# Patient Record
Sex: FEMALE | Race: WHITE | Employment: OTHER | RURAL
[De-identification: names, ages, dates, MRNs, and addresses within clinical notes are randomized per-mention and may not be internally consistent; named-entity substitution may affect disease eponyms.]

---

## 2017-02-23 ENCOUNTER — TELEPHONE (OUTPATIENT)
Dept: FAMILY MEDICINE CLINIC | Age: 77
End: 2017-02-23

## 2017-02-23 ENCOUNTER — OFFICE VISIT (OUTPATIENT)
Dept: FAMILY MEDICINE CLINIC | Age: 77
End: 2017-02-23

## 2017-02-23 DIAGNOSIS — R82.90 URINE ABNORMALITY: Primary | ICD-10-CM

## 2017-02-23 LAB
BILIRUB UR QL STRIP: NEGATIVE
GLUCOSE UR-MCNC: NEGATIVE MG/DL
KETONES P FAST UR STRIP-MCNC: NEGATIVE MG/DL
PH UR STRIP: 7 [PH] (ref 4.6–8)
PROT UR QL STRIP: NEGATIVE MG/DL
SP GR UR STRIP: 1.01 (ref 1–1.03)
UA UROBILINOGEN AMB POC: NORMAL (ref 0.2–1)
URINALYSIS CLARITY POC: CLEAR
URINALYSIS COLOR POC: YELLOW
URINE BLOOD POC: NEGATIVE
URINE LEUKOCYTES POC: NORMAL
URINE NITRITES POC: NEGATIVE

## 2017-02-23 RX ORDER — NITROFURANTOIN 25; 75 MG/1; MG/1
100 CAPSULE ORAL 2 TIMES DAILY
Qty: 10 CAP | Refills: 1 | Status: SHIPPED | OUTPATIENT
Start: 2017-02-23 | End: 2017-03-17

## 2017-02-23 NOTE — TELEPHONE ENCOUNTER
Patient would like to bring in a U/A for Sx of a UTI. Try cell number if you can't reach her on the house line.

## 2017-02-23 NOTE — PROGRESS NOTES
SUBJECTIVE: Giuliana Lindsay is a 64 y.o. female who complains of urinary frequency, urgency and dysuria x 2 days, without flank pain, fever, chills, or abnormal discharge or bleeding. OBJECTIVE: Appears well, in no apparent distress. Vital signs are normal. The abdomen is soft without tenderness, guarding, mass, rebound or organomegaly. No CVA tenderness or inguinal adenopathy noted. Urine dipstick shows positive for leukocytes. ASSESSMENT: UTI uncomplicated without evidence of pyelonephritis    PLAN: Treatment per orders - also push fluids, may use Pyridium OTC prn. Call or return to clinic prn if these symptoms worsen or fail to improve as anticipated.       Torres Irving MD    \

## 2017-02-23 NOTE — MR AVS SNAPSHOT
Visit Information Date & Time Provider Department Dept. Phone Encounter #  
 2/23/2017 10:30 AM Hannah Franklin MD [address] [place] [address] [phone] [id number] Upcoming Health Maintenance Date Due DTaP/Tdap/Td series (1 - Tdap) 5/29/1959 OSTEOPOROSIS SCREENING (DEXA) 5/29/2003 Pneumococcal 65+ Low/Medium Risk (1 of 2 - PCV13) 5/29/2003 GLAUCOMA SCREENING Q2Y 1/1/2016 INFLUENZA AGE 9 TO ADULT 8/1/2016 MEDICARE YEARLY EXAM 4/14/2017 Allergies as of 2/23/2017  Review Complete On: 4/13/2016 By: ERNESTINE Franco No Known Allergies Current Immunizations  Reviewed on 4/13/2016 No immunizations on file. Not reviewed this visit You Were Diagnosed With   
  
 Codes Comments Urine abnormality    -  Primary ICD-10-CM: R82.90 ICD-9-CM: 791.9 Vitals OB Status Hysterectomy Preferred Pharmacy Pharmacy Name Phone [address] [phone] Your Updated Medication List  
  
   
This list is accurate as of: 2/23/17 11:23 AM.  Always use your most recent med list.  
  
  
  
  
 aspirin 81 mg chewable tablet Take 81 mg by mouth daily. CRAN- mg Cap capsule Generic drug:  cranberry extract Take 500 mg by mouth daily. ESTRACE 0.01 % (0.1 mg/gram) vaginal cream  
Generic drug:  estradiol  
insert 1/2 gram vaginally two times a week  
  
 omeprazole 20 mg capsule Commonly known as:  PRILOSEC  
TAKE 1 CAPSULE EVERY DAY We Performed the Following AMB POC URINALYSIS DIP STICK AUTO W/O MICRO [40167 CPT(R)] CULTURE, URINE [id number] CPT(R)] Introducing [facility]! Selena Davis introduces [employer] patient portal. Now you can access parts of your medical record, email your doctor's office, and request medication refills online.    
 
1. In your internet browser, go to [email or web address] 2. Click on the First Time User? Click Here link in the Sign In box. You will see the New Member Sign Up page. 3. Enter your [employer] Access Code exactly as it appears below. You will not need to use this code after youve completed the sign-up process. If you do not sign up before the expiration date, you must request a new code. · [employer] Access Code: [email or web address] Expires: 5/24/2017 11:14 AM 
 
4. Enter the last four digits of your Social Security Number (xxxx) and Date of Birth (mm/dd/yyyy) as indicated and click Submit. You will be taken to the next sign-up page. 5. Create a Nimiat ID. This will be your [employer]Tax login ID and cannot be changed, so think of one that is secure and easy to remember. 6. Create a [employer] password. You can change your password at any time. 7. Enter your Password Reset Question and Answer. This can be used at a later time if you forget your password. 8. Enter your e-mail address. You will receive e-mail notification when new information is available in [address] 9. Click Sign Up. You can now view and download portions of your medical record. 10. Click the Download Summary menu link to download a portable copy of your medical information. If you have questions, please visit the Frequently Asked Questions section of the [employer] website. Remember, [employer] is NOT to be used for urgent needs. For medical emergencies, dial 911. Now available from your iPhone and Android! Please provide this summary of care documentation to your next provider. Your primary care clinician is listed as Aditya Cadet. If you have any questions after today's visit, please call [phone].

## 2017-02-25 LAB — BACTERIA UR CULT: ABNORMAL

## 2017-03-17 ENCOUNTER — OFFICE VISIT (OUTPATIENT)
Dept: FAMILY MEDICINE CLINIC | Age: 77
End: 2017-03-17

## 2017-03-17 VITALS
RESPIRATION RATE: 20 BRPM | DIASTOLIC BLOOD PRESSURE: 70 MMHG | TEMPERATURE: 98.1 F | WEIGHT: 137 LBS | BODY MASS INDEX: 21.5 KG/M2 | SYSTOLIC BLOOD PRESSURE: 130 MMHG | OXYGEN SATURATION: 99 % | HEART RATE: 81 BPM | HEIGHT: 67 IN

## 2017-03-17 DIAGNOSIS — R39.9 UTI SYMPTOMS: Primary | ICD-10-CM

## 2017-03-17 LAB
BILIRUB UR QL STRIP: NEGATIVE
GLUCOSE UR-MCNC: NEGATIVE MG/DL
KETONES P FAST UR STRIP-MCNC: NEGATIVE MG/DL
PH UR STRIP: 7 [PH] (ref 4.6–8)
PROT UR QL STRIP: NEGATIVE MG/DL
SP GR UR STRIP: 1.02 (ref 1–1.03)
UA UROBILINOGEN AMB POC: NORMAL (ref 0.2–1)
URINALYSIS CLARITY POC: CLEAR
URINALYSIS COLOR POC: YELLOW
URINE BLOOD POC: NEGATIVE
URINE LEUKOCYTES POC: NORMAL
URINE NITRITES POC: NEGATIVE

## 2017-03-17 RX ORDER — SULFAMETHOXAZOLE AND TRIMETHOPRIM 800; 160 MG/1; MG/1
1 TABLET ORAL 2 TIMES DAILY
Qty: 10 TAB | Refills: 0 | Status: SHIPPED | OUTPATIENT
Start: 2017-03-17 | End: 2017-03-22

## 2017-03-17 NOTE — MR AVS SNAPSHOT
Visit Information Date & Time Provider Department Dept. Phone Encounter #  
 3/17/2017  3:00 PM Aditya Cadet, [address] [phone] [id number] Upcoming Health Maintenance Date Due DTaP/Tdap/Td series (1 - Tdap) 5/29/1959 OSTEOPOROSIS SCREENING (DEXA) 5/29/2003 Pneumococcal 65+ Low/Medium Risk (1 of 2 - PCV13) 5/29/2003 GLAUCOMA SCREENING Q2Y 1/1/2016 INFLUENZA AGE 9 TO ADULT 8/1/2016 MEDICARE YEARLY EXAM 4/14/2017 Allergies as of 3/17/2017  Review Complete On: 3/17/2017 By: ERNESTINE Schmidt No Known Allergies Current Immunizations  Reviewed on 4/13/2016 No immunizations on file. Not reviewed this visit You Were Diagnosed With   
  
 Codes Comments UTI symptoms    -  Primary ICD-10-CM: R39.9 ICD-9-CM: 788.99 Vitals BP Pulse Temp Resp Height(growth percentile) 130/70 (BP 1 Location: Left arm, BP Patient Position: Sitting) 81 98.1 °F (36.7 °C) (Temporal) 20 5' 7\" (1.702 m) Weight(growth percentile) SpO2 BMI OB Status Smoking Status 137 lb (62.1 kg) 99% 21.46 kg/m2 Hysterectomy Former Smoker Vitals History BMI and BSA Data Body Mass Index Body Surface Area  
 21.46 kg/m 2 1.71 m 2 Preferred Pharmacy Pharmacy Name Phone [address] [place] Silke Delgado [phone] Your Updated Medication List  
  
   
This list is accurate as of: 3/17/17  3:16 PM.  Always use your most recent med list.  
  
  
  
  
 aspirin 81 mg chewable tablet Take 81 mg by mouth daily. CRAN- mg Cap capsule Generic drug:  cranberry extract Take 500 mg by mouth daily. ESTRACE 0.01 % (0.1 mg/gram) vaginal cream  
Generic drug:  estradiol  
insert 1/2 gram vaginally two times a week  
  
 omeprazole 20 mg capsule Commonly known as:  PRILOSEC  
TAKE 1 CAPSULE EVERY DAY  
  
 trimethoprim-sulfamethoxazole 160-800 mg per tablet Commonly known as:  BACTRIM DS, SEPTRA DS Take 1 Tab by mouth two (2) times a day for 5 days. Prescriptions Sent to Pharmacy Refills  
 trimethoprim-sulfamethoxazole (BACTRIM DS, SEPTRA DS) 160-800 mg per tablet 0 Sig: Take 1 Tab by mouth two (2) times a day for 5 days. Class: Normal  
 Pharmacy: [address] [place], [address]  #: [phone] Route: Oral  
  
We Performed the Following AMB POC URINALYSIS DIP STICK AUTO W/O MICRO [39409 CPT(R)] Comments: AMB POC URINALYSIS DIP STICK AUTO W/O  
 CULTURE, URINE [93172 CPT(R)] Introducing [facility]! [employer] introduces [employer] patient portal. Now you can access parts of your medical record, email your doctor's office, and request medication refills online. 1. In your internet browser, go to [email or web address] 2. Click on the First Time User? Click Here link in the Sign In box. You will see the New Member Sign Up page. 3. Enter your [employer] Access Code exactly as it appears below. You will not need to use this code after youve completed the sign-up process. If you do not sign up before the expiration date, you must request a new code. · [employer] Access Code: [email or web address] Expires: 5/24/2017 12:14 PM 
 
4. Enter the last four digits of your Social Security Number (xxxx) and Date of Birth (mm/dd/yyyy) as indicated and click Submit. You will be taken to the next sign-up page. 5. Create a Smart Device Mediat ID. This will be your [employer] login ID and cannot be changed, so think of one that is secure and easy to remember. 6. Create a [employer] password. You can change your password at any time. 7. Enter your Password Reset Question and Answer. This can be used at a later time if you forget your password. 8. Enter your e-mail address.  You will receive e-mail notification when new information is available in [employer]. 9. Click Sign Up. You can now view and download portions of your medical record. 10. Click the Download Summary menu link to download a portable copy of your medical information. If you have questions, please visit the Frequently Asked Questions section of the [employer] website. Remember, [employer] is NOT to be used for urgent needs. For medical emergencies, dial 911. Now available from your iPhone and Android! Please provide this summary of care documentation to your next provider. Your primary care clinician is listed as Yesi Garrison. If you have any questions after today's visit, please call [phone].

## 2017-03-17 NOTE — PROGRESS NOTES
[address], [facility]   [phone]     3/23/2017  Chief Complaint   Patient presents with    Bladder Infection       HPI  Ms. Oanh Conti is a 64 y.o. female presents today with c/o continued urinary burning and irritation. Positive urine culture 2/23/17. Treated with nitrofurantion. States she did not tolerate taking nitrofurantion and did not finish taking the medication. Review of Systems   Constitutional: Negative for chills and fever. Gastrointestinal: Positive for nausea. Negative for abdominal pain and vomiting. Genitourinary: Positive for dysuria, frequency and urgency. Negative for flank pain and hematuria. Patient Active Problem List   Diagnosis Code    GERD (gastroesophageal reflux disease) K21.9    Hypercholesterolemia E78.00      No Known Allergies    Current Outpatient Prescriptions on File Prior to Visit   Medication Sig Dispense Refill    omeprazole (PRILOSEC) 20 mg capsule TAKE 1 CAPSULE EVERY DAY 90 Cap 1    ESTRACE 0.01 % (0.1 mg/gram) vaginal cream insert 1/2 gram vaginally two times a week 42.5 g PRN    cranberry extract (CRAN-MAX) 500 mg cap capsule Take 500 mg by mouth daily.  aspirin 81 mg chewable tablet Take 81 mg by mouth daily. No current facility-administered medications on file prior to visit.       Results for orders placed or performed in visit on 03/17/17   CULTURE, URINE   Result Value Ref Range    Urine Culture, Routine No growth     Narrative    Performed at:  [address]  [id number]  : Tangela Diamond MD, Phone:  [phone]   AMB POC URINALYSIS DIP STICK AUTO W/O MICRO   Result Value Ref Range    Color (UA POC) Yellow     Clarity (UA POC) Clear     Glucose (UA POC) Negative Negative    Bilirubin (UA POC) Negative Negative    Ketones (UA POC) Negative Negative    Specific gravity (UA POC) 1.020 1.001 - 1.035    Blood (UA POC) Negative Negative    pH (UA POC) 7.0 4.6 - 8.0    Protein (UA POC) Negative Negative mg/dL    Urobilinogen (UA POC) 0.2 mg/dL 0.2 - 1    Nitrites (UA POC) Negative Negative    Leukocyte esterase (UA POC) Trace Negative           Physical Exam   Constitutional: No distress. Abdominal: Soft. There is no tenderness. Vitals reviewed. Visit Vitals    /70 (BP 1 Location: Left arm, BP Patient Position: Sitting)    Pulse 81    Temp 98.1 °F (36.7 °C) (Temporal)    Resp 20    Ht 5' 7\" (1.702 m)    Wt 137 lb (62.1 kg)    SpO2 99%    BMI 21.46 kg/m2         Kevan Santiago was seen today for bladder infection. Diagnoses and all orders for this visit:    UTI symptoms  -     AMB POC URINALYSIS DIP STICK AUTO W/O MICRO  -     CULTURE, URINE  -     trimethoprim-sulfamethoxazole (BACTRIM DS, SEPTRA DS) 160-800 mg per tablet; Take 1 Tab by mouth two (2) times a day for 5 days. Plan         Follow-up Disposition:  Return if symptoms worsen or fail to improve.       Terese Liriano PA-C, P

## 2017-03-19 LAB — BACTERIA UR CULT: NO GROWTH

## 2017-07-05 RX ORDER — OMEPRAZOLE 20 MG/1
CAPSULE, DELAYED RELEASE ORAL
Qty: 90 CAP | Refills: 1 | Status: SHIPPED | OUTPATIENT
Start: 2017-07-05 | End: 2017-11-20 | Stop reason: SDUPTHER

## 2017-09-07 ENCOUNTER — CLINICAL SUPPORT (OUTPATIENT)
Dept: FAMILY MEDICINE CLINIC | Age: 77
End: 2017-09-07

## 2017-09-07 ENCOUNTER — TELEPHONE (OUTPATIENT)
Dept: FAMILY MEDICINE CLINIC | Age: 77
End: 2017-09-07

## 2017-09-07 DIAGNOSIS — R82.90 URINE ABNORMALITY: Primary | ICD-10-CM

## 2017-09-07 LAB
BILIRUB UR QL STRIP: NEGATIVE
GLUCOSE UR-MCNC: NEGATIVE MG/DL
KETONES P FAST UR STRIP-MCNC: NEGATIVE MG/DL
PH UR STRIP: 6 [PH] (ref 4.6–8)
PROT UR QL STRIP: NEGATIVE MG/DL
SP GR UR STRIP: 1.02 (ref 1–1.03)
UA UROBILINOGEN AMB POC: NORMAL (ref 0.2–1)
URINALYSIS CLARITY POC: CLEAR
URINALYSIS COLOR POC: YELLOW
URINE BLOOD POC: NEGATIVE
URINE LEUKOCYTES POC: NEGATIVE
URINE NITRITES POC: NEGATIVE

## 2017-11-21 RX ORDER — OMEPRAZOLE 20 MG/1
CAPSULE, DELAYED RELEASE ORAL
Qty: 90 CAP | Refills: 1 | Status: SHIPPED | OUTPATIENT
Start: 2017-11-21 | End: 2018-04-09 | Stop reason: SDUPTHER

## 2018-04-10 RX ORDER — OMEPRAZOLE 20 MG/1
CAPSULE, DELAYED RELEASE ORAL
Qty: 90 CAP | Refills: 1 | Status: SHIPPED | OUTPATIENT
Start: 2018-04-10 | End: 2018-08-27 | Stop reason: SDUPTHER

## 2018-08-28 RX ORDER — OMEPRAZOLE 20 MG/1
CAPSULE, DELAYED RELEASE ORAL
Qty: 90 CAP | Refills: 1 | Status: SHIPPED | OUTPATIENT
Start: 2018-08-28 | End: 2019-01-14 | Stop reason: SDUPTHER

## 2018-11-12 PROBLEM — R20.0 PERIORAL NUMBNESS: Status: ACTIVE | Noted: 2018-11-12

## 2018-11-12 PROBLEM — M54.9 ACUTE BILATERAL BACK PAIN: Status: ACTIVE | Noted: 2018-11-12

## 2018-11-14 ENCOUNTER — DOCUMENTATION ONLY (OUTPATIENT)
Dept: CARDIOLOGY CLINIC | Age: 78
End: 2018-11-14

## 2018-11-14 ENCOUNTER — TELEPHONE (OUTPATIENT)
Dept: CARDIOLOGY CLINIC | Age: 78
End: 2018-11-14

## 2018-11-14 ENCOUNTER — OFFICE VISIT (OUTPATIENT)
Dept: CARDIOLOGY CLINIC | Age: 78
End: 2018-11-14

## 2018-11-14 VITALS
DIASTOLIC BLOOD PRESSURE: 76 MMHG | OXYGEN SATURATION: 100 % | HEART RATE: 76 BPM | HEIGHT: 67 IN | SYSTOLIC BLOOD PRESSURE: 144 MMHG | BODY MASS INDEX: 21.35 KG/M2 | WEIGHT: 136 LBS | RESPIRATION RATE: 16 BRPM

## 2018-11-14 DIAGNOSIS — N39.0 URINARY TRACT INFECTION WITHOUT HEMATURIA, SITE UNSPECIFIED: ICD-10-CM

## 2018-11-14 DIAGNOSIS — K21.9 GASTROESOPHAGEAL REFLUX DISEASE WITHOUT ESOPHAGITIS: ICD-10-CM

## 2018-11-14 DIAGNOSIS — R53.83 FATIGUE, UNSPECIFIED TYPE: Primary | ICD-10-CM

## 2018-11-14 DIAGNOSIS — M54.9 ACUTE BILATERAL BACK PAIN, UNSPECIFIED BACK LOCATION: ICD-10-CM

## 2018-11-14 DIAGNOSIS — R61 DIAPHORESIS: ICD-10-CM

## 2018-11-14 DIAGNOSIS — R06.09 DOE (DYSPNEA ON EXERTION): ICD-10-CM

## 2018-11-14 DIAGNOSIS — E78.00 HYPERCHOLESTEROLEMIA: ICD-10-CM

## 2018-11-14 DIAGNOSIS — R94.31 EKG ABNORMALITY: ICD-10-CM

## 2018-11-14 DIAGNOSIS — R03.0 ELEVATED BLOOD PRESSURE READING WITHOUT DIAGNOSIS OF HYPERTENSION: ICD-10-CM

## 2018-11-14 RX ORDER — ASPIRIN 81 MG/1
81 TABLET ORAL DAILY
Qty: 30 TAB | Refills: 0
Start: 2018-11-14 | End: 2018-12-20 | Stop reason: ALTCHOICE

## 2018-11-14 NOTE — TELEPHONE ENCOUNTER
----- Message from Chantale Anderson MD sent at 11/14/2018  2:23 PM EST -----  Regarding: troponin  Advise heart muscle blood enzyme test was normal/negative.   Thanks [facility]

## 2018-11-14 NOTE — TELEPHONE ENCOUNTER
Spoke with the patient. Verified patient with two patient identifiers. TROPONIN result given and questions answered. Patient verbalized understanding.

## 2018-11-14 NOTE — PROGRESS NOTES
Received a call from [address] [place] [address] with [facility]. [address] [place] [address] spoke with the [facility] office and was told that the labs were drawn at [address] that was NOT the information that was given to me earlier this morning from [facility] office. I then called [address] [place] [address] and spoke with Aide Clifton. Aide Clifton is investigating.

## 2018-11-14 NOTE — PROGRESS NOTES
Aiyana Giles is a [de-identified] y.o. female is here for cardiac evaluation. Previously healthy, hx GERD, dyslipidemia, recurrent UTI's without known cardiac hx. Episode over this past weekend while in [place] with travel group of sweating, fatigue, tightness across back. While they were walking, she experienced an episode of sharp pain that shot across her lower back. She states that after this she felt like she had broke out in sweats all over her face, and had to sit down. She did states she felt slightly dizzy. She also mentioned that at that time, her vision is tunneled and she had vomited, then sx resolved. She recovered from this incident, but has noted that over the last few months she has been having increasing fatigue and at times feels like she has some numbness around her mouth. At today's visit, she does not complain of any pain in her back, nor in her chest.  There is no chest pressure. She does have chronic GERD sx and takes prilosec daily. Mild RAM x \"long time\". difficulty breathing, nausea, vomiting, fevers, chills, abdominal pain, claudication. No lower extremity swelling or pain when walking. Her history is significant for recurrent UTIs--seen on 11/7 by NP and had urine culture sent, other labs as noted. .   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 Labs sent--CBC, CMP, BNP normal, d-diimer 1.22 (mildly elev); troponin ordered but no results currently (contacting lab). Lipids, HbA1c from 11/7 as noted, + urine cult for e. coli. The patient denies chest pain, orthopnea, PND, LE edema, palpitations, syncope, presyncope.        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    Dysuria 10/22/2014    GERD (gastroesophageal reflux disease)     Hypercholesterolemia       Irma Pena NP  Past Medical History:   Diagnosis Date    Encounter for monitoring primary estrogen replacement therapy     GERD (gastroesophageal reflux disease)     Hypercholesterolemia       Past Surgical History:   Procedure Laterality Date    HX COLONOSCOPY  2010    HX ENDOSCOPY  2010    HX GYN      hysterectomy partial    HX HEENT      cataract both eyes     No Known Allergies   Family History   Problem Relation Age of Onset    Cancer Father 76        throat    Diabetes Sister     Heart Disease Sister     Cancer Brother 66        pancreatis      Social History     Socioeconomic History    Marital status:      Spouse name: Not on file    Number of children: Not on file    Years of education: Not on file    Highest education level: Not on file   Social Needs    Financial resource strain: Not on file    Food insecurity - worry: Not on file    Food insecurity - inability: Not on file   [employer] needs - medical: Not on file   [email or web address] needs - non-medical: Not on file   Occupational History    Not on file   Tobacco Use    Smoking status: Former Smoker    Smokeless tobacco: Never Used   Substance and Sexual Activity    Alcohol use: Not on file    Drug use: Not on file    Sexual activity: Not on file   Other Topics Concern    Not on file   Social History Narrative    Not on file      Current Outpatient Medications   Medication Sig    omeprazole (PRILOSEC) 20 mg capsule TAKE 1 CAPSULE EVERY DAY     No current facility-administered medications for this visit. Review of Symptoms:    CONST  No weight change. No fever, chills, sweats    ENT No visual changes, URI sx, sore throat    CV  See HPI   RESP  No cough, or sputum, wheezing. Also see HPI   GI  No abdominal pain or change in bowel habits. No heartburn or dysphagia. No melena or rectal bleeding.       No dysuria, urgency, frequency, hematuria   MSKEL  No joint pain, swelling. No muscle pain. SKIN  No rash or lesions. NEURO  No headache, syncope, or seizure. No weakness, loss of sensation, or paresthesias. PSYCH  No low mood or depression  No anxiety. HE/LYMPH  No easy bruising, abnormal bleeding, or enlarged glands.         Physical ExamPhysical Exam:    Visit Vitals  /76 (BP 1 Location: Left arm, BP Patient Position: Sitting)   Pulse 76   Resp 16   Ht 5' 7\" (1.702 m)   Wt 136 lb (61.7 kg)   SpO2 100% Comment: ra   BMI 21.30 kg/m²     Gen: NAD  HEENT:  PERRL, throat clear  Neck: no adenopathy, no thyromegaly, no JVD   Heart:  Regular,Nl S1S2,  no murmur, gallop or rub.   Lungs:  clear  Abdomen:   Soft, non-tender, bowel sounds are active.   Extremities:  No edema  Pulse: symmetric  Neuro: A&O times 3, No focal neuro deficits    Cardiographics    ECG: NSR, wnl    Labs:   Lab Results   Component Value Date/Time    Sodium 135 11/07/2018 08:07 AM    Sodium 135 04/13/2016 08:17 AM    Sodium 137 07/07/2015 08:45 AM    Potassium 4.6 11/07/2018 08:07 AM    Potassium 4.3 04/13/2016 08:17 AM    Potassium 4.4 07/07/2015 08:45 AM    Chloride 97 11/07/2018 08:07 AM    Chloride 96 (L) 04/13/2016 08:17 AM    Chloride 99 07/07/2015 08:45 AM    CO2 25 11/07/2018 08:07 AM    CO2 26 04/13/2016 08:17 AM    CO2 21 07/07/2015 08:45 AM    Glucose 88 11/07/2018 08:07 AM    Glucose 103 (H) 04/13/2016 08:17 AM    Glucose 109 (H) 07/07/2015 08:45 AM    BUN 9 11/07/2018 08:07 AM    BUN 8 04/13/2016 08:17 AM    BUN 10 07/07/2015 08:45 AM    Creatinine 0.63 11/07/2018 08:07 AM    Creatinine 0.66 04/13/2016 08:17 AM    Creatinine 0.67 07/07/2015 08:45 AM    BUN/Creatinine ratio 14 11/07/2018 08:07 AM    BUN/Creatinine ratio 12 04/13/2016 08:17 AM    BUN/Creatinine ratio 15 07/07/2015 08:45 AM    GFR est AA 98 11/07/2018 08:07 AM    GFR est AA 99 04/13/2016 08:17 AM    GFR est AA 98 07/07/2015 08:45 AM    GFR est non-AA 85 11/07/2018 08:07 AM    GFR est non-AA 85 04/13/2016 08:17 AM GFR est non-AA 85 07/07/2015 08:45 AM    Calcium 9.6 11/07/2018 08:07 AM    Calcium 9.6 04/13/2016 08:17 AM    Calcium 10.0 07/07/2015 08:45 AM    Bilirubin, total 0.5 11/07/2018 08:07 AM    Bilirubin, total 0.5 04/13/2016 08:17 AM    AST (SGOT) 21 11/07/2018 08:07 AM    AST (SGOT) 18 04/13/2016 08:17 AM    AST (SGOT) 19 07/07/2015 08:45 AM    Alk. phosphatase 67 11/07/2018 08:07 AM    Alk. phosphatase 84 04/13/2016 08:17 AM    Protein, total 7.6 11/07/2018 08:07 AM    Protein, total 7.5 04/13/2016 08:17 AM    Albumin 4.4 11/07/2018 08:07 AM    Albumin 4.3 04/13/2016 08:17 AM    A-G Ratio 1.4 11/07/2018 08:07 AM    A-G Ratio 1.3 04/13/2016 08:17 AM    ALT (SGPT) 12 11/07/2018 08:07 AM    ALT (SGPT) 11 04/13/2016 08:17 AM     No results found for: CPK, CPKX, CPX  Lab Results   Component Value Date/Time    Cholesterol, total 185 11/07/2018 08:07 AM    Cholesterol, total 177 04/13/2016 08:17 AM    Cholesterol, total 194 07/07/2015 08:45 AM    HDL Cholesterol 70 11/07/2018 08:07 AM    HDL Cholesterol 71 04/13/2016 08:17 AM    HDL Cholesterol 71 07/07/2015 08:45 AM    LDL, calculated 96 11/07/2018 08:07 AM    LDL, calculated 85 04/13/2016 08:17 AM    LDL, calculated 98 07/07/2015 08:45 AM    Triglyceride 96 11/07/2018 08:07 AM    Triglyceride 105 04/13/2016 08:17 AM    Triglyceride 126 07/07/2015 08:45 AM     No results found for this or any previous visit. 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 Episode over this past weekend while in [address] with travel group of sweating, fatigue, tightness across back. While they were walking, she experienced an episode of sharp pain that shot across her lower back. She states that after this she felt like she had broke out in sweats all over her face, and had to sit down. She did states she felt slightly dizzy. She also mentioned that at that time, her vision is tunneled and she had vomited, then sx resolved. She recovered from this incident, but has noted that over the last few months she has been having increasing fatigue and at times feels like she has some numbness around her mouth. At today's visit, she does not complain of any pain in her back, nor in her chest.  There is no chest pressure. She does have chronic GERD sx and takes prilosec daily. Mild RAM x \"long time\". difficulty breathing, nausea, vomiting, fevers, chills, abdominal pain, claudication. No lower extremity swelling or pain when walking. Her history is significant for recurrent UTIs--seen on 11/7 by NP and had urine culture sent, other labs as noted. .   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 Labs sent--CBC, CMP, BNP normal, d-diimer 1.22 (mildly elev); troponin ordered but no results currently. Lipids, HbA1c from 11/7 as noted, + urine cult for e. Coli.      Plan:     Stress Cardiolyte--r/o ischemia/CAD (RAM, back pain, diaphoresis, EKG abnormalities)  Echo/doppler (RAM)  Continue PPI--needs GI w/u, long-standing GERD, daily  rx for UTI  ASA 81 for now  To ER if acute recurrent sx  F/u after testing to discuss    ADDENDUM:  Troponin was normal/negative <.05     Barbara Mac MD

## 2018-11-14 NOTE — PROGRESS NOTES
Worked this pt into Dr. Jennifer Wright schedule today per Dr. Heaven Mcmahan and Manoj, DO. Called the [place] office regarding the unresulted TROPONIN that was ordered by Emily Moore on 11/12 and lab was collected on 11/12/18. I was told by Angie Ford w/ [facility] that the lab has not been resulted yet. I asked if [facility] could be called in order to better treat the pt. I was told by Angie Ford that unfortunately the task could not be done at the time of the call because [facility] are swamped. \"    I called [facility] for the [facility] and spoke with Mitchell De La Cruz. After being on the phone for 20 minutes w/ [facility] I was told NO TROPONIN was found.   Dina Humphrey was going to call [place] office regarding because the charting system states:  TROPONIN    SpecimenType: Serum       Collected: 11/12/2018 10:34 AM

## 2018-11-14 NOTE — PROGRESS NOTES
Verified patient with two patient identifiers. Medications reviewed/approved by Dr. Luisana Reaves. A verbal from Dr. Luisana Reaves was given to remove any medications that were deleted during the visit. Medication(s) removed: none    Chief Complaint   Patient presents with    Abnormal EKG     New patient evaluation - referred by Dr. Martin Lay     1. Have you been to the ER, urgent care clinic since your last visit? Hospitalized since your last visit? New pt.    2. Have you seen or consulted any other health care providers outside of the [address] [place] [address] since your last visit? Include any pap smears or colon screening. New pt.

## 2018-11-19 DIAGNOSIS — R53.83 FATIGUE, UNSPECIFIED TYPE: Primary | ICD-10-CM

## 2018-11-19 DIAGNOSIS — R06.09 DYSPNEA ON EXERTION: ICD-10-CM

## 2018-11-19 DIAGNOSIS — M54.9 BACK PAIN, UNSPECIFIED BACK LOCATION, UNSPECIFIED BACK PAIN LATERALITY, UNSPECIFIED CHRONICITY: ICD-10-CM

## 2018-11-19 DIAGNOSIS — R94.31 EKG ABNORMALITY: ICD-10-CM

## 2018-11-19 DIAGNOSIS — R07.9 CHEST PAIN, UNSPECIFIED TYPE: ICD-10-CM

## 2018-11-19 DIAGNOSIS — R61 DIAPHORESIS: ICD-10-CM

## 2018-11-19 DIAGNOSIS — R03.0 ELEVATED BLOOD PRESSURE READING WITHOUT DIAGNOSIS OF HYPERTENSION: ICD-10-CM

## 2018-11-19 DIAGNOSIS — R06.02 SOB (SHORTNESS OF BREATH): ICD-10-CM

## 2018-11-28 ENCOUNTER — TELEPHONE (OUTPATIENT)
Dept: CARDIOLOGY CLINIC | Age: 78
End: 2018-11-28

## 2018-11-28 DIAGNOSIS — R06.02 SHORTNESS OF BREATH: ICD-10-CM

## 2018-11-28 DIAGNOSIS — R61 DIAPHORESIS: ICD-10-CM

## 2018-11-28 DIAGNOSIS — R03.0 ELEVATED BLOOD PRESSURE READING WITHOUT DIAGNOSIS OF HYPERTENSION: ICD-10-CM

## 2018-11-28 DIAGNOSIS — R94.31 ABNORMAL ELECTROCARDIOGRAM: ICD-10-CM

## 2018-11-28 DIAGNOSIS — M54.9 BACK PAIN, UNSPECIFIED BACK LOCATION, UNSPECIFIED BACK PAIN LATERALITY, UNSPECIFIED CHRONICITY: ICD-10-CM

## 2018-11-28 DIAGNOSIS — R53.83 FATIGUE, UNSPECIFIED TYPE: Primary | ICD-10-CM

## 2018-11-28 DIAGNOSIS — R07.9 CHEST PAIN, UNSPECIFIED TYPE: ICD-10-CM

## 2018-11-28 DIAGNOSIS — R06.09 DYSPNEA ON EXERTION: ICD-10-CM

## 2018-11-28 NOTE — TELEPHONE ENCOUNTER
Please call Hua Mendoza @ [facility] scheduling regarding patient Timmy Dickson [birth year].     Thanks  Jonas Veira @ [facility], [facility] [employer] [facility] office ext [phone]

## 2018-11-28 NOTE — TELEPHONE ENCOUNTER
Spoke with Hua Mendoza regarding. Had to changed the NM stress test order again. Verbal order per Dr. Gardenia Fowler. Order repeated and verified twice.

## 2018-11-30 ENCOUNTER — TELEPHONE (OUTPATIENT)
Dept: CARDIOLOGY CLINIC | Age: 78
End: 2018-11-30

## 2018-11-30 NOTE — TELEPHONE ENCOUNTER
----- Message from Chantale Anderson MD sent at 11/30/2018  8:33 AM EST -----  Regarding: stress MPI  Advise stress nuclear scan normal, no blockages seen. Thanks [facility]  Advise echo shows low normal LV pumping function, valves with only mild abnormalities--no concerns. [facility] [place]     Spoke with the patient. Verified patient with two patient identifiers. Results given and questions answered. Patient verbalized understanding.     APPT:  Thursday, December 20, 2018 09:20 AM

## 2018-12-20 ENCOUNTER — OFFICE VISIT (OUTPATIENT)
Dept: CARDIOLOGY CLINIC | Age: 78
End: 2018-12-20

## 2018-12-20 VITALS
HEART RATE: 76 BPM | BODY MASS INDEX: 21.35 KG/M2 | OXYGEN SATURATION: 100 % | WEIGHT: 136 LBS | DIASTOLIC BLOOD PRESSURE: 90 MMHG | SYSTOLIC BLOOD PRESSURE: 166 MMHG | RESPIRATION RATE: 12 BRPM | HEIGHT: 67 IN

## 2018-12-20 DIAGNOSIS — R53.83 FATIGUE, UNSPECIFIED TYPE: ICD-10-CM

## 2018-12-20 DIAGNOSIS — R06.09 DYSPNEA ON EXERTION: ICD-10-CM

## 2018-12-20 DIAGNOSIS — K21.9 GASTROESOPHAGEAL REFLUX DISEASE WITHOUT ESOPHAGITIS: ICD-10-CM

## 2018-12-20 DIAGNOSIS — I10 ESSENTIAL HYPERTENSION: Primary | ICD-10-CM

## 2018-12-20 RX ORDER — LOSARTAN POTASSIUM 25 MG/1
25 TABLET ORAL DAILY
Qty: 90 TAB | Refills: 3 | Status: SHIPPED | OUTPATIENT
Start: 2018-12-20 | End: 2019-02-14 | Stop reason: SINTOL

## 2018-12-20 NOTE — PROGRESS NOTES
PATIENT ID VERIFIED WITH TWO PATIENT IDENTIFIERS. PATIENT MEDICATIONS REVIEWED AND APPROVED BY DR. Dana Barcenas. MEDICATIONS THAT WERE REMOVED FROM THIS VISIT HAVE BEEN APPROVED BY DR. Dana Barcenas. Chief Complaint   Patient presents with    Results     Follow up cardiac testing echocardiogram and stress test       1. Have you been to the ER, urgent care clinic since your last visit? Hospitalized since your last visit?no    2. Have you seen or consulted any other health care providers outside of the [facility] since your last visit? Include any pap smears or colon screening. no

## 2018-12-20 NOTE — PROGRESS NOTES
Maggie Lake is a [de-identified] y.o. female is here for f/u to discuss test results. Stress Cardiolyte 11/29/18 with Toan to 3:39,  (100% apm), no chest pain or EKG changes (sx of dyspnea, fatigue), MPI with normal perfusion/no infarct or ischemia, LVEF 55%. Had resting hypertension, continued with exercise. Echo/doppler 11/16/18 with LVEF 71-03, grade I diastolic, AV sclerosis/no stenosis, mild MR. No current CV sx or complaints. The patient denies chest pain/ shortness of breath, orthopnea, PND, LE edema, palpitations, syncope, presyncope.        Patient Active Problem List    Diagnosis Date Noted    Essential hypertension 12/20/2018    Acute bilateral back pain 11/12/2018    Perioral numbness 11/12/2018    Contact dermatitis due to poison ivy 08/25/2015    Urinary tract infection 08/16/2015    Spasmodic torticollis 06/02/2015    Dysuria 10/22/2014    GERD (gastroesophageal reflux disease)     Hypercholesterolemia       Carlos Prajapati NP  Past Medical History:   Diagnosis Date    Encounter for monitoring primary estrogen replacement therapy     GERD (gastroesophageal reflux disease)     Hypercholesterolemia       Past Surgical History:   Procedure Laterality Date    HX COLONOSCOPY  2010    HX ENDOSCOPY  2010    HX GYN      hysterectomy partial    HX HEENT      cataract both eyes     No Known Allergies   Family History   Problem Relation Age of Onset    Cancer Father 76        throat    Diabetes Sister     Heart Disease Sister     Cancer Brother 66        pancreatis    Coronary Artery Disease Brother       Social History     Socioeconomic History    Marital status:      Spouse name: Not on file    Number of children: Not on file    Years of education: Not on file    Highest education level: Not on file   Social Needs    Financial resource strain: Not on file    Food insecurity - worry: Not on file    Food insecurity - inability: Not on file   [place] needs - medical: Not on file    Transportation needs - non-medical: Not on file   Occupational History    Not on file   Tobacco Use    Smoking status: Never Smoker    Smokeless tobacco: Never Used   Substance and Sexual Activity    Alcohol use: Not on file    Drug use: Not on file    Sexual activity: Not on file   Other Topics Concern    Not on file   Social History Narrative    Not on file      Current Outpatient Medications   Medication Sig    losartan (COZAAR) 25 mg tablet Take 1 Tab by mouth daily.  omeprazole (PRILOSEC) 20 mg capsule TAKE 1 CAPSULE EVERY DAY     No current facility-administered medications for this visit. Review of Symptoms:    CONST  No weight change. No fever, chills, sweats    ENT No visual changes, URI sx, sore throat    CV  See HPI   RESP  No cough, or sputum, wheezing. Also see HPI   GI  No abdominal pain or change in bowel habits. No heartburn or dysphagia. No melena or rectal bleeding.   No dysuria, urgency, frequency, hematuria   MSKEL  No joint pain, swelling. No muscle pain. SKIN  No rash or lesions. NEURO  No headache, syncope, or seizure. No weakness, loss of sensation, or paresthesias. PSYCH  No low mood or depression  No anxiety. HE/LYMPH  No easy bruising, abnormal bleeding, or enlarged glands.         Physical ExamPhysical Exam:    Visit Vitals  /90 (BP 1 Location: Left arm, BP Patient Position: Sitting)   Pulse 76   Resp 12   Ht 5' 7\" (1.702 m)   Wt 136 lb (61.7 kg)   SpO2 100%   BMI 21.30 kg/m²     Gen: NAD  HEENT:  PERRL, throat clear  Neck: no adenopathy, no thyromegaly, no JVD   Heart:  Regular,Nl S1S2,  no murmur, gallop or rub.   Lungs:  clear  Abdomen:   Soft, non-tender, bowel sounds are active.   Extremities:  No edema  Pulse: symmetric  Neuro: A&O times 3, No focal neuro deficits    Cardiographics    Labs:   Lab Results   Component Value Date/Time    Sodium 135 11/07/2018 08:07 AM    Sodium 135 04/13/2016 08:17 AM Sodium 137 07/07/2015 08:45 AM    Potassium 4.6 11/07/2018 08:07 AM    Potassium 4.3 04/13/2016 08:17 AM    Potassium 4.4 07/07/2015 08:45 AM    Chloride 97 11/07/2018 08:07 AM    Chloride 96 (L) 04/13/2016 08:17 AM    Chloride 99 07/07/2015 08:45 AM    CO2 25 11/07/2018 08:07 AM    CO2 26 04/13/2016 08:17 AM    CO2 21 07/07/2015 08:45 AM    Glucose 88 11/07/2018 08:07 AM    Glucose 103 (H) 04/13/2016 08:17 AM    Glucose 109 (H) 07/07/2015 08:45 AM    BUN 9 11/07/2018 08:07 AM    BUN 8 04/13/2016 08:17 AM    BUN 10 07/07/2015 08:45 AM    Creatinine 0.63 11/07/2018 08:07 AM    Creatinine 0.66 04/13/2016 08:17 AM    Creatinine 0.67 07/07/2015 08:45 AM    BUN/Creatinine ratio 14 11/07/2018 08:07 AM    BUN/Creatinine ratio 12 04/13/2016 08:17 AM    BUN/Creatinine ratio 15 07/07/2015 08:45 AM    GFR est AA 98 11/07/2018 08:07 AM    GFR est AA 99 04/13/2016 08:17 AM    GFR est AA 98 07/07/2015 08:45 AM    GFR est non-AA 85 11/07/2018 08:07 AM    GFR est non-AA 85 04/13/2016 08:17 AM    GFR est non-AA 85 07/07/2015 08:45 AM    Calcium 9.6 11/07/2018 08:07 AM    Calcium 9.6 04/13/2016 08:17 AM    Calcium 10.0 07/07/2015 08:45 AM    Bilirubin, total 0.5 11/07/2018 08:07 AM    Bilirubin, total 0.5 04/13/2016 08:17 AM    AST (SGOT) 21 11/07/2018 08:07 AM    AST (SGOT) 18 04/13/2016 08:17 AM    AST (SGOT) 19 07/07/2015 08:45 AM    Alk. phosphatase 67 11/07/2018 08:07 AM    Alk.  phosphatase 84 04/13/2016 08:17 AM    Protein, total 7.6 11/07/2018 08:07 AM    Protein, total 7.5 04/13/2016 08:17 AM    Albumin 4.4 11/07/2018 08:07 AM    Albumin 4.3 04/13/2016 08:17 AM    A-G Ratio 1.4 11/07/2018 08:07 AM    A-G Ratio 1.3 04/13/2016 08:17 AM    ALT (SGPT) 12 11/07/2018 08:07 AM    ALT (SGPT) 11 04/13/2016 08:17 AM     No results found for: CPK, CPKX, CPX  Lab Results   Component Value Date/Time    Cholesterol, total 185 11/07/2018 08:07 AM    Cholesterol, total 177 04/13/2016 08:17 AM    Cholesterol, total 194 07/07/2015 08:45 AM HDL Cholesterol 70 11/07/2018 08:07 AM    HDL Cholesterol 71 04/13/2016 08:17 AM    HDL Cholesterol 71 07/07/2015 08:45 AM    LDL, calculated 96 11/07/2018 08:07 AM    LDL, calculated 85 04/13/2016 08:17 AM    LDL, calculated 98 07/07/2015 08:45 AM    Triglyceride 96 11/07/2018 08:07 AM    Triglyceride 105 04/13/2016 08:17 AM    Triglyceride 126 07/07/2015 08:45 AM     No results found for this or any previous visit. Assessment:         Patient Active Problem List    Diagnosis Date Noted    Essential hypertension 12/20/2018    Acute bilateral back pain 11/12/2018    Perioral numbness 11/12/2018    Contact dermatitis due to poison ivy 08/25/2015    Urinary tract infection 08/16/2015    Spasmodic torticollis 06/02/2015    Dysuria 10/22/2014    GERD (gastroesophageal reflux disease)     Hypercholesterolemia      Stress Cardiolyte 11/29/18 with Toan to 3:39,  (100% apm), no chest pain or EKG changes (sx of dyspnea, fatigue), MPI with normal perfusion/no infarct or ischemia, LVEF 55%. Echo/doppler 11/16/18 with LVEF 30-81, grade I diastolic, AV sclerosis/no stenosis, mild MR. No current CV sx or complaints. Had resting hypertension, continued with exercise. Plan:     Essentially neg cardiac w/u at this point. No recurrence of sx, neg stress MPI and Echo  Hypertension, multiple SBP readings >140 (and 190/ at time of stress test)  Favor starting Losartan 25mg every day--f/u BP with PCP checks, etc  Continue prilosec (GERD)  Stop ASA   Continue current care and f/u in 12 months.     France Guzman MD

## 2019-01-15 RX ORDER — OMEPRAZOLE 20 MG/1
CAPSULE, DELAYED RELEASE ORAL
Qty: 90 CAP | Refills: 1 | Status: SHIPPED | OUTPATIENT
Start: 2019-01-15 | End: 2019-07-10 | Stop reason: SDUPTHER

## 2019-02-28 PROBLEM — M62.838 MUSCLE SPASM: Status: ACTIVE | Noted: 2019-02-28

## 2019-07-22 PROBLEM — N30.00 ACUTE CYSTITIS WITHOUT HEMATURIA: Status: ACTIVE | Noted: 2019-07-22

## 2019-07-22 PROBLEM — R21 RASH AND NONSPECIFIC SKIN ERUPTION: Status: ACTIVE | Noted: 2019-07-22

## 2021-04-13 ENCOUNTER — OFFICE VISIT (OUTPATIENT)
Dept: FAMILY MEDICINE CLINIC | Age: 81
End: 2021-04-13
Payer: MEDICARE

## 2021-04-13 VITALS
HEART RATE: 89 BPM | RESPIRATION RATE: 16 BRPM | BODY MASS INDEX: 21.5 KG/M2 | TEMPERATURE: 97.3 F | OXYGEN SATURATION: 99 % | WEIGHT: 137 LBS | DIASTOLIC BLOOD PRESSURE: 74 MMHG | SYSTOLIC BLOOD PRESSURE: 146 MMHG | HEIGHT: 67 IN

## 2021-04-13 DIAGNOSIS — Z00.00 MEDICARE ANNUAL WELLNESS VISIT, SUBSEQUENT: ICD-10-CM

## 2021-04-13 DIAGNOSIS — R39.9 UTI SYMPTOMS: Primary | ICD-10-CM

## 2021-04-13 DIAGNOSIS — Z13.31 SCREENING FOR DEPRESSION: ICD-10-CM

## 2021-04-13 DIAGNOSIS — Z13.39 SCREENING FOR ALCOHOLISM: ICD-10-CM

## 2021-04-13 DIAGNOSIS — E55.9 VITAMIN D DEFICIENCY: ICD-10-CM

## 2021-04-13 DIAGNOSIS — E78.00 HYPERCHOLESTEROLEMIA: ICD-10-CM

## 2021-04-13 DIAGNOSIS — I10 ESSENTIAL HYPERTENSION: ICD-10-CM

## 2021-04-13 DIAGNOSIS — R79.9 ABNORMAL FINDING OF BLOOD CHEMISTRY, UNSPECIFIED: ICD-10-CM

## 2021-04-13 LAB
BILIRUB UR QL STRIP: NEGATIVE
GLUCOSE UR-MCNC: NEGATIVE MG/DL
KETONES P FAST UR STRIP-MCNC: NEGATIVE MG/DL
PH UR STRIP: 6 [PH] (ref 4.6–8)
PROT UR QL STRIP: NEGATIVE
SP GR UR STRIP: 1.02 (ref 1–1.03)
UA UROBILINOGEN AMB POC: NORMAL (ref 0.2–1)
URINALYSIS CLARITY POC: CLEAR
URINALYSIS COLOR POC: YELLOW
URINE BLOOD POC: NEGATIVE
URINE LEUKOCYTES POC: NORMAL
URINE NITRITES POC: NEGATIVE

## 2021-04-13 PROCEDURE — 81003 URINALYSIS AUTO W/O SCOPE: CPT | Performed by: INTERNAL MEDICINE

## 2021-04-13 PROCEDURE — G0444 DEPRESSION SCREEN ANNUAL: HCPCS | Performed by: INTERNAL MEDICINE

## 2021-04-13 PROCEDURE — 36415 COLL VENOUS BLD VENIPUNCTURE: CPT | Performed by: INTERNAL MEDICINE

## 2021-04-13 PROCEDURE — G0439 PPPS, SUBSEQ VISIT: HCPCS | Performed by: INTERNAL MEDICINE

## 2021-04-13 RX ORDER — CEPHALEXIN 250 MG/1
250 CAPSULE ORAL 4 TIMES DAILY
Qty: 20 CAP | Refills: 0 | Status: SHIPPED | OUTPATIENT
Start: 2021-04-13 | End: 2021-04-18

## 2021-04-13 NOTE — PROGRESS NOTES
Learning Assessment 1/3/2020   PRIMARY LEARNER Patient   HIGHEST LEVEL OF EDUCATION - PRIMARY LEARNER  GRADUATED HIGH SCHOOL OR GED   BARRIERS PRIMARY LEARNER NONE   CO-LEARNER CAREGIVER No   PRIMARY LANGUAGE ENGLISH   LEARNER PREFERENCE PRIMARY READING     -   ANSWERED BY Patient   RELATIONSHIP SELF       1. Have you been to the ER, urgent care clinic since your last visit? Hospitalized since your last visit? No    2. Have you seen or consulted any other health care providers outside of the [address] [place] [address] since your last visit? Include any pap smears or colon screening. No    This is the Subsequent Medicare Annual Wellness Exam, performed 12 months or more after the Initial AWV or the last Subsequent AWV    I have reviewed the patient's medical history in detail and updated the computerized patient record. Assessment/Plan   Education and counseling provided:  Are appropriate based on today's review and evaluation    1. Medicare annual wellness visit, subsequent  -     IN ANNUAL ALCOHOL SCREEN [address]  2. Screening for alcoholism  -     IN ANNUAL ALCOHOL SCREEN 15 MIN  -     DEPRESSION SCREEN ANNUAL  3.  Screening for depression  -     DEPRESSION SCREEN ANNUAL       Depression Risk Factor Screening     3 most recent PHQ Screens 4/13/2021   Little interest or pleasure in doing things Not at all   Feeling down, depressed, irritable, or hopeless Not at all   Total Score PHQ 2 0   Trouble falling or staying asleep, or sleeping too much Not at all   Feeling tired or having little energy Not at all   Poor appetite, weight loss, or overeating Not at all   Feeling bad about yourself - or that you are a failure or have let yourself or your family down Not at all   Trouble concentrating on things such as school, work, reading, or watching TV Not at all   Moving or speaking so slowly that other people could have noticed; or the opposite being so fidgety that others notice Not at all   Thoughts of being better off dead, or hurting yourself in some way Not at all   PHQ 9 Score 0       Alcohol Risk Screen    Do you average more than 1 drink per night or more than 7 drinks a week:  No    On any one occasion in the past three months have you have had more than 3 drinks containing alcohol:  No        Functional Ability and Level of Safety    Hearing: Hearing is good. Activities of Daily Living: The home contains: handrails and grab bars  Patient does total self care      Ambulation: with no difficulty     Fall Risk:  Fall Risk Assessment, last 12 mths 4/13/2021   Able to walk? Yes   Fall in past 12 months? 0   Do you feel unsteady?  0   Are you worried about falling 0      Abuse Screen:  Patient is not abused       Cognitive Screening    Has your family/caregiver stated any concerns about your memory: no     Cognitive Screening:      Health Maintenance Due     Health Maintenance Due   Topic Date Due    COVID-19 Vaccine (1) Never done    DTaP/Tdap/Td series (1 - Tdap) Never done    Shingrix Vaccine Age 50> (1 of 2) Never done    Bone Densitometry (Dexa) Screening  Never done    Pneumococcal 65+ years (1 of 1 - PPSV23) Never done       Patient Care Team   Patient Care Team:  Regine Jones DO as PCP - General (Internal Medicine)  Regine Jones [facility]CP [facility]  Aubrie Marin MD (Cardiology)  Aubrie Marin MD (Cardiology)    History     Patient Active Problem List   Diagnosis Code    GERD (gastroesophageal reflux disease) K21.9    Hypercholesterolemia E78.00    Spasmodic torticollis G24.3    Acute bilateral back pain M54.9    Perioral numbness R20.0    Essential hypertension I10    Muscle spasm M62.838    Acute cystitis without hematuria N30.00    Rash and nonspecific skin eruption R21     Past Medical History:   Diagnosis Date    Encounter for monitoring primary estrogen replacement therapy     GERD (gastroesophageal reflux disease)     Hypercholesterolemia       Past Surgical History:   Procedure Laterality Date    HX COLONOSCOPY  2010    HX ENDOSCOPY  2010    HX GYN      hysterectomy partial    HX HEENT      cataract both eyes     Current Outpatient Medications   Medication Sig Dispense Refill    telmisartan (MICARDIS) 20 mg tablet TAKE 1 TABLET EVERY DAY 90 Tab 3    Omeprazole delayed release (PRILOSEC D/R) 20 mg tablet Take 1 Tab by mouth daily.  80 Tab 3     No Known Allergies    Family History   Problem Relation Age of Onset    Cancer Father 76        throat    Diabetes Sister     Heart Disease Sister     Cancer Brother 66        pancreatis    Coronary Artery Disease Brother      Social History     Tobacco Use    Smoking status: Never Smoker    Smokeless tobacco: Never Used   Substance Use Topics    Alcohol use: Not on file         Mercy Hoffmann LPN

## 2021-04-13 NOTE — PATIENT INSTRUCTIONS
Medicare Wellness Visit, Female The best way to live healthy is to have a lifestyle where you eat a well-balanced diet, exercise regularly, limit alcohol use, and quit all forms of tobacco/nicotine, if applicable. Regular preventive services are another way to keep healthy. Preventive services (vaccines, screening tests, monitoring & exams) can help personalize your care plan, which helps you manage your own care. Screening tests can find health problems at the earliest stages, when they are easiest to treat. [facility] follows the current, evidence-based guidelines published by the [facility] ([facility]) when recommending preventive services for our patients. Because we follow these guidelines, sometimes recommendations change over time as research supports it. (For example, mammograms used to be recommended annually. Even though Medicare will still pay for an annual mammogram, the newer guidelines recommend a mammogram every two years for women of average risk). Of course, you and your doctor may decide to screen more often for some diseases, based on your risk and your co-morbidities (chronic disease you are already diagnosed with). Preventive services for you include: - Medicare offers their members a free annual wellness visit, which is time for you and your primary care provider to discuss and plan for your preventive service needs. Take advantage of this benefit every year! 
-All adults over the age of 72 should receive the recommended pneumonia vaccines. Current USPSTF guidelines recommend a series of two vaccines for the best pneumonia protection.  
-All adults should have a flu vaccine yearly and a tetanus vaccine every 10 years.  
-All adults age 48 and older should receive the shingles vaccines (series of two vaccines).      
-All adults age 38-68 who are overweight should have a diabetes screening test once every three years.  
-All adults born between [birth year] and 1967 should be screened once for Hepatitis C. 
-Other screening tests and preventive services for persons with diabetes include: an eye exam to screen for diabetic retinopathy, a kidney function test, a foot exam, and stricter control over your cholesterol.  
-Cardiovascular screening for adults with routine risk involves an electrocardiogram (ECG) at intervals determined by your doctor.  
-Colorectal cancer screenings should be done for adults age 54-65 with no increased risk factors for colorectal cancer. There are a number of acceptable methods of screening for this type of cancer. Each test has its own benefits and drawbacks. Discuss with your doctor what is most appropriate for you during your annual wellness visit. The different tests include: colonoscopy (considered the best screening method), a fecal occult blood test, a fecal DNA test, and sigmoidoscopy. 
 
-A bone mass density test is recommended when a woman turns 65 to screen for osteoporosis. This test is only recommended one time, as a screening. Some providers will use this same test as a disease monitoring tool if you already have osteoporosis. -Breast cancer screenings are recommended every other year for women of normal risk, age 54-69. 
-Cervical cancer screenings for women over age 72 are only recommended with certain risk factors. Here is a list of your current Health Maintenance items (your personalized list of preventive services) with a due date: 
Health Maintenance Due Topic Date Due  
 COVID-19 Vaccine (1) Never done  DTaP/Tdap/Td  (1 - Tdap) Never done  Shingles Vaccine (1 of 2) Never done  Bone Mineral Density   Never done  Pneumococcal Vaccine (1 of 1 - PPSV23) Never done

## 2021-04-13 NOTE — PROGRESS NOTES
Luis Rahman is a 78 y.o. female who presents to the office today with the following:  Chief Complaint   Patient presents with    UTI    Annual Wellness Visit     Tongue numbness:  Intermittent numbness of tongue, has been going on for some time - mentioned at last visit in 2020 with NP Gustavo Gillis, per her report - has plates on the bottom; doesn't remember when she was last at the dentist, thinks it's been within the last year. Hasn't noted any sores or lesions in mouth, but worried this may be related to stroke. No change in taste, no water brash, although she has done a lot of restricting in her diet due to reflux not being well controlled. Feels that she has been having increased urination frequency, especially at night. Feels like she has pressure in the lower abdomen. Denies pyuria, dysuria, hematuria. Past history of multiple UTIs with E. coli with intermediate resistance to cefuroxime, resistant to ampicillin, resistant to ciprofloxacin, levofloxacin, but sensitive to nitrofurantoin and Bactrim on culture 10/23/2019 and on 9/25/2019 pansensitive and on 7/10/2019 also pansensitive. HTN:  No n/v/f/c/cp/chest pressure/abd pain, no palpitations or racing heart. No shortness of breath or difficulty breathing. No headaches, no cough. Blood pressure borderline. Does monitor blood pressure at home. Key CAD CHF Meds             telmisartan (MICARDIS) 20 mg tablet (Taking) TAKE 1 TABLET EVERY DAY            No Known Allergies    Current Outpatient Medications   Medication Sig    telmisartan (MICARDIS) 20 mg tablet TAKE 1 TABLET EVERY DAY    Omeprazole delayed release (PRILOSEC D/R) 20 mg tablet Take 1 Tab by mouth daily. No current facility-administered medications for this visit.         Past Medical History:   Diagnosis Date    Encounter for monitoring primary estrogen replacement therapy     GERD (gastroesophageal reflux disease)     Hypercholesterolemia        Past Surgical History: Procedure Laterality Date    HX COLONOSCOPY  2010    HX ENDOSCOPY  2010    HX GYN      hysterectomy partial    HX HEENT      cataract both eyes       Social History     Socioeconomic History    Marital status:      Spouse name: Not on file    Number of children: Not on file    Years of education: Not on file    Highest education level: Not on file   Tobacco Use    Smoking status: Never Smoker    Smokeless tobacco: Never Used       Social History     Tobacco Use   Smoking Status Never Smoker   Smokeless Tobacco Never Used       Family History   Problem Relation Age of Onset    Cancer Father 76        throat    Diabetes Sister     Heart Disease Sister     Cancer Brother 66        pancreatis    Coronary Artery Disease Brother        Vitals:    04/13/21 0728   BP: (!) 146/74   BP 1 Location: Left upper arm   BP Patient Position: Sitting   BP Cuff Size: Adult   Pulse: 89   Resp: 16   Temp: 97.3 °F (36.3 °C)   TempSrc: Temporal   SpO2: 99%   Weight: 137 lb (62.1 kg)   Height: 5' 7\" (1.702 m)         3 most recent PHQ Screens 4/13/2021   Little interest or pleasure in doing things Not at all   Feeling down, depressed, irritable, or hopeless Not at all   Total Score PHQ 2 0   Trouble falling or staying asleep, or sleeping too much Not at all   Feeling tired or having little energy Not at all   Poor appetite, weight loss, or overeating Not at all   Feeling bad about yourself - or that you are a failure or have let yourself or your family down Not at all   Trouble concentrating on things such as school, work, reading, or watching TV Not at all   Moving or speaking so slowly that other people could have noticed; or the opposite being so fidgety that others notice Not at all   Thoughts of being better off dead, or hurting yourself in some way Not at all   PHQ 9 Score 0       ROS:  Denies fever, chills, cough, chest pain or pressure, SOB/BLAS,  nausea, vomiting, or diarrhea/constipation.   No changes in vision or hearing. No new or worsening headaches. No edema, no claudication. Denies wt loss, wt gain, hemoptysis, hematochezia or melena. No dysuria, pyuria, frequency. No polydipsia, polyuria. No new weakness, arthralgias, myalgias. No weakness, dizziness, lightheadedness, numbness or tingling. No recent changes in moods. Physical Examination:    GENERAL APPEARANCE: NAD, appears stated age, comfortable  VITAL SIGNS:   Visit Vitals  BP (!) 146/74 (BP 1 Location: Left upper arm, BP Patient Position: Sitting, BP Cuff Size: Adult)   Pulse 89   Temp 97.3 °F (36.3 °C) (Temporal)   Resp 16   Ht 5' 7\" (1.702 m)   Wt 137 lb (62.1 kg)   SpO2 99%   BMI 21.46 kg/m²     HEENT: Normocephalic and atraumatic. No scleral icterus. Pupils are equal, round, and reactive to light and accommodation. No conjunctival injection is noted. Resting head tremor. Tympanic membranes are clear. NECK: Supple. No lymphadenopathy or tenderness. LUNGS: Breath sounds are equal and clear bilaterally. No wheezes, rhonchi, or rales. HEART: Regular rate and rhythm with normal S1 and S2. No murmurs, gallops, or rubs. ABDOMEN: Soft, flat, and benign. No tenderness, guarding, or rebound. EXTREMITIES: No cyanosis, clubbing, or edema. NEUROLOGIC: CN II-XII intact; no focal neurological deficits. PSYCHIATRIC: The patient is awake, alert, and oriented x3. Recent and remote memory is intact. Appropriate mood and affect. SKIN: Warm, dry, and well perfused. Good turgor. No lesions, nodules or rashes are noted. LYMPHATICS: No cervical, axillary, or groin adenopathy is noted. ASSESSMENT AND PLAN:    Urinary frequency  UA checked, will send for culture given symptoms, push fluids    HTN  Monitor at home, on telmisartan 20 mg.   She is worried that her altered taste is due to stroke, discussed s/sx stroke and importance of HTN control    Tongue altered taste/sensation  Numbness in mouth/altered sensation - encouraged her to f/u with dentistry and discussed etiologies, which are many. Maintain moisture in mouth, good mouth hygiene  Orders Placed This Encounter    ANNUAL DEPRESSION SCREEN 8-15 MIN    CULTURE, URINE     Standing Status:   Future     Standing Expiration Date:   4/13/2022    CBC WITH AUTOMATED DIFF     Standing Status:   Future     Number of Occurrences:   1     Standing Expiration Date:   8/21/5180    METABOLIC PANEL, COMPREHENSIVE     Standing Status:   Future     Number of Occurrences:   1     Standing Expiration Date:   4/13/2022    LIPID PANEL     Standing Status:   Future     Number of Occurrences:   1     Standing Expiration Date:   4/13/2022    HEMOGLOBIN A1C WITH EAG     Standing Status:   Future     Number of Occurrences:   1     Standing Expiration Date:   4/13/2022    VITAMIN D, 25 HYDROXY     Standing Status:   Future     Number of Occurrences:   1     Standing Expiration Date:   4/13/2022    TSH 3RD GENERATION     Standing Status:   Future     Number of Occurrences:   1     Standing Expiration Date:   4/13/2022    AMB POC URINALYSIS DIP STICK AUTO W/O MICRO     AMB POC URINALYSIS DIP STICK AUTO W/O    ANNUAL ALCOHOL SCREEN 8-15 MIN    COLLECTION VENOUS BLOOD,VENIPUNCTURE     Standing Status:   Future     Number of Occurrences:   1     Standing Expiration Date:   10/13/2021       Patient to return PRN for any new symptoms, call/come to clinic if notices any side effects from medication or any new side effects, and return for regularly scheduled visit in 3 months. Patient verbalized understanding of and agreement to treatment plan. Patient has been advised to contact practice or seek care if condition persists or worsens. Herlinda Avina, DO      This documentation was facilitated by voice recognition software and may contain inadvertent typographical errors. Please note that this dictation was completed with "[employer] the computer voice recognition software.   Quite often unanticipated grammatical, syntax, homophones, and other interpretive errors are inadvertently transcribed by the computer software. Please disregard these errors. Please excuse any errors that have escaped final proofreading.

## 2021-04-14 LAB
25(OH)D3 SERPL-MCNC: 13.7 NG/ML (ref 30–100)
ALBUMIN SERPL-MCNC: 3.9 G/DL (ref 3.5–5)
ALBUMIN/GLOB SERPL: 1 {RATIO} (ref 1.1–2.2)
ALP SERPL-CCNC: 77 U/L (ref 45–117)
ALT SERPL-CCNC: 16 U/L (ref 12–78)
ANION GAP SERPL CALC-SCNC: 5 MMOL/L (ref 5–15)
AST SERPL-CCNC: 18 U/L (ref 15–37)
BASOPHILS # BLD: 0.1 K/UL (ref 0–0.1)
BASOPHILS NFR BLD: 1 % (ref 0–1)
BILIRUB SERPL-MCNC: 0.4 MG/DL (ref 0.2–1)
BUN SERPL-MCNC: 11 MG/DL (ref 6–20)
BUN/CREAT SERPL: 17 (ref 12–20)
CALCIUM SERPL-MCNC: 9.6 MG/DL (ref 8.5–10.1)
CHLORIDE SERPL-SCNC: 99 MMOL/L (ref 97–108)
CHOLEST SERPL-MCNC: 180 MG/DL
CO2 SERPL-SCNC: 28 MMOL/L (ref 21–32)
CREAT SERPL-MCNC: 0.65 MG/DL (ref 0.55–1.02)
DIFFERENTIAL METHOD BLD: NORMAL
EOSINOPHIL # BLD: 0.1 K/UL (ref 0–0.4)
EOSINOPHIL NFR BLD: 2 % (ref 0–7)
ERYTHROCYTE [DISTWIDTH] IN BLOOD BY AUTOMATED COUNT: 13.6 % (ref 11.5–14.5)
EST. AVERAGE GLUCOSE BLD GHB EST-MCNC: 111 MG/DL
GLOBULIN SER CALC-MCNC: 4 G/DL (ref 2–4)
GLUCOSE SERPL-MCNC: 95 MG/DL (ref 65–100)
HBA1C MFR BLD: 5.5 % (ref 4–5.6)
HCT VFR BLD AUTO: 41.2 % (ref 35–47)
HDLC SERPL-MCNC: 74 MG/DL
HDLC SERPL: 2.4 {RATIO} (ref 0–5)
HGB BLD-MCNC: 12.9 G/DL (ref 11.5–16)
IMM GRANULOCYTES # BLD AUTO: 0 K/UL (ref 0–0.04)
IMM GRANULOCYTES NFR BLD AUTO: 0 % (ref 0–0.5)
LDLC SERPL CALC-MCNC: 82 MG/DL (ref 0–100)
LIPID PROFILE,FLP: NORMAL
LYMPHOCYTES # BLD: 1 K/UL (ref 0.8–3.5)
LYMPHOCYTES NFR BLD: 20 % (ref 12–49)
MCH RBC QN AUTO: 28.2 PG (ref 26–34)
MCHC RBC AUTO-ENTMCNC: 31.3 G/DL (ref 30–36.5)
MCV RBC AUTO: 90.2 FL (ref 80–99)
MONOCYTES # BLD: 0.7 K/UL (ref 0–1)
MONOCYTES NFR BLD: 13 % (ref 5–13)
NEUTS SEG # BLD: 3.4 K/UL (ref 1.8–8)
NEUTS SEG NFR BLD: 64 % (ref 32–75)
NRBC # BLD: 0 K/UL (ref 0–0.01)
NRBC BLD-RTO: 0 PER 100 WBC
PLATELET # BLD AUTO: 191 K/UL (ref 150–400)
POTASSIUM SERPL-SCNC: 4.3 MMOL/L (ref 3.5–5.1)
PROT SERPL-MCNC: 7.9 G/DL (ref 6.4–8.2)
RBC # BLD AUTO: 4.57 M/UL (ref 3.8–5.2)
SODIUM SERPL-SCNC: 132 MMOL/L (ref 136–145)
TRIGL SERPL-MCNC: 120 MG/DL (ref ?–150)
TSH SERPL DL<=0.05 MIU/L-ACNC: 2.42 UIU/ML (ref 0.36–3.74)
VLDLC SERPL CALC-MCNC: 24 MG/DL
WBC # BLD AUTO: 5.3 K/UL (ref 3.6–11)

## 2021-04-15 LAB
BACTERIA SPEC CULT: NORMAL
SERVICE CMNT-IMP: NORMAL

## 2021-04-15 RX ORDER — OMEPRAZOLE 40 MG/1
40 CAPSULE, DELAYED RELEASE ORAL DAILY
Qty: 90 CAP | Refills: 0 | Status: SHIPPED | OUTPATIENT
Start: 2021-04-15 | End: 2021-06-13

## 2021-04-20 RX ORDER — ERGOCALCIFEROL 1.25 MG/1
50000 CAPSULE ORAL
Qty: 12 CAP | Refills: 0 | Status: SHIPPED | OUTPATIENT
Start: 2021-04-20 | End: 2021-07-07

## 2021-04-20 NOTE — PROGRESS NOTES
Overall odds are looking good. Vitamin D is low so I sent a supplement into the pharmacy. Keep up the great work! !!

## 2021-09-23 ENCOUNTER — TELEPHONE (OUTPATIENT)
Dept: FAMILY MEDICINE CLINIC | Age: 81
End: 2021-09-23

## 2021-09-23 RX ORDER — PANTOPRAZOLE SODIUM 40 MG/1
40 TABLET, DELAYED RELEASE ORAL DAILY
COMMUNITY
End: 2021-09-23 | Stop reason: SDUPTHER

## 2021-09-23 NOTE — TELEPHONE ENCOUNTER
----- Message from [facility] sent at 9/23/2021 11:33 AM EDT -----  Regarding: ARIANNA Abdullahi / Telephone  General Message/Vendor Calls    Caller's first and last name: Addison Houser      Reason for call: Herman Perez  Please Call       Callback required yes/no and why: Yes      Best contact number(s): [phone]      Details to clarify the request:      [facility]

## 2021-09-23 NOTE — TELEPHONE ENCOUNTER
S/w patient reports she had an appendectomy at [facility] and f/u with DR Leo Brought and ordered Pantoprazole 40 mg and only has 2 days left.  She said KEELY Kelley needs us her PCP to refill, REFUGIO

## 2021-09-24 RX ORDER — PANTOPRAZOLE SODIUM 40 MG/1
40 TABLET, DELAYED RELEASE ORAL DAILY
Qty: 90 TABLET | Refills: 0 | Status: SHIPPED | OUTPATIENT
Start: 2021-09-24 | End: 2021-12-17 | Stop reason: SDUPTHER

## 2021-12-17 ENCOUNTER — OFFICE VISIT (OUTPATIENT)
Dept: FAMILY MEDICINE CLINIC | Age: 81
End: 2021-12-17
Payer: MEDICARE

## 2021-12-17 VITALS
WEIGHT: 131.6 LBS | HEIGHT: 67 IN | BODY MASS INDEX: 20.65 KG/M2 | RESPIRATION RATE: 16 BRPM | SYSTOLIC BLOOD PRESSURE: 130 MMHG | OXYGEN SATURATION: 98 % | TEMPERATURE: 97.8 F | DIASTOLIC BLOOD PRESSURE: 70 MMHG | HEART RATE: 88 BPM

## 2021-12-17 DIAGNOSIS — I10 ESSENTIAL HYPERTENSION: ICD-10-CM

## 2021-12-17 DIAGNOSIS — E78.00 HYPERCHOLESTEROLEMIA: ICD-10-CM

## 2021-12-17 DIAGNOSIS — R82.90 URINE ABNORMALITY: ICD-10-CM

## 2021-12-17 DIAGNOSIS — K21.9 GASTROESOPHAGEAL REFLUX DISEASE WITHOUT ESOPHAGITIS: Primary | ICD-10-CM

## 2021-12-17 LAB
BILIRUB UR QL STRIP: NEGATIVE
GLUCOSE UR-MCNC: NEGATIVE MG/DL
KETONES P FAST UR STRIP-MCNC: NEGATIVE MG/DL
PH UR STRIP: 7.5 [PH] (ref 4.6–8)
PROT UR QL STRIP: NEGATIVE
SP GR UR STRIP: 1.02 (ref 1–1.03)
UA UROBILINOGEN AMB POC: NORMAL (ref 0.2–1)
URINALYSIS CLARITY POC: CLEAR
URINALYSIS COLOR POC: YELLOW
URINE BLOOD POC: NEGATIVE
URINE LEUKOCYTES POC: NORMAL
URINE NITRITES POC: NEGATIVE

## 2021-12-17 PROCEDURE — 99214 OFFICE O/P EST MOD 30 MIN: CPT | Performed by: NURSE PRACTITIONER

## 2021-12-17 RX ORDER — PANTOPRAZOLE SODIUM 40 MG/1
40 TABLET, DELAYED RELEASE ORAL DAILY
Qty: 90 TABLET | Refills: 3 | Status: SHIPPED | OUTPATIENT
Start: 2021-12-17 | End: 2022-09-01 | Stop reason: SDUPTHER

## 2021-12-17 NOTE — PROGRESS NOTES
Chief Complaint   Patient presents with    Medication Refill       HPI:    Echo Medrano is a 78 y.o. female who presents to the clinic for her check up. Recurrent UTIs: No issues lately, requesting a UA. HTN: Compliant with telmisartan. Does not routinely monitor BP at home. Denies HA, CP. GERD: Prevacid changed to pantoprazole after her appendectomy. New issues: Patient had an appendectomy with Dr. Beny Murguia on 8/16/21. Needs a refill of her pantoprazole. No Known Allergies    Current Outpatient Medications   Medication Sig    pantoprazole (PROTONIX) 40 mg tablet Take 1 Tablet by mouth daily.  telmisartan (MICARDIS) 20 mg tablet TAKE 1 TABLET EVERY DAY     No current facility-administered medications for this visit. Past Medical History:   Diagnosis Date    Encounter for monitoring primary estrogen replacement therapy     GERD (gastroesophageal reflux disease)     Hypercholesterolemia        Family History   Problem Relation Age of Onset    Cancer Father 76        throat    Diabetes Sister     Heart Disease Sister     Cancer Brother 66        pancreatis    Coronary Art Dis Brother        ROS:  Denies fever, chills, cough, chest pain, SOB,  nausea, vomiting, diarrhea, dysuria. Denies rashes, wounds, arthralgias, weakness, numbness, visual changes, depression, + wt loss, wt gain, hemoptysis, hematochezia or melena. Patient is not experiencing chest pain radiating to the jaw and/or down the arms.     Physical Examination:    /70 (BP 1 Location: Right arm, BP Patient Position: Sitting)   Pulse 88   Temp 97.8 °F (36.6 °C) (Temporal)   Resp 16   Ht 5' 7\" (1.702 m)   Wt 131 lb 9.6 oz (59.7 kg)   SpO2 98%   BMI 20.61 kg/m²     Wt Readings from Last 3 Encounters:   12/17/21 131 lb 9.6 oz (59.7 kg)   04/13/21 137 lb (62.1 kg)   02/05/20 132 lb 9.6 oz (60.1 kg)     Constitutional: WDWN Female in no acute distress  HEENT: NC/AT, PERRL, TMs pearly gray, OP: normal  Respiratory: Respirations even and unlabored without use of accessory muscles, CTA throughout without wheezes, rales, rubs or rhonchi. Symmetrical chest expansion. Cardiac: RRR no clicks, murmurs, gallops, or rubs  Musculoskeletal:  No cyanosis, clubbing or edema of extremities. Moves all extremities without difficulty. Neurologic:  Smooth, even gait without assistance, CN 2-12 grossly intact. Skin: intact and warm to the touch, no rash   Lymphadenopathy: no cervical or supraclavicular nodes  Psych: Pleasant and appropriate. Judgment normal. Alert and oriented x 3. ASSESSMENT AND PLAN:       ICD-10-CM ICD-9-CM    1. Gastroesophageal reflux disease without esophagitis  K21.9 530.81 pantoprazole (PROTONIX) 40 mg tablet   2. Urine abnormality  R82.90 791.9 AMB POC URINALYSIS DIP STICK MANUAL W/O MICRO   3. Essential hypertension  I10 401.9 COLLECTION VENOUS BLOOD,VENIPUNCTURE      CBC WITH AUTOMATED DIFF      LIPID PANEL      METABOLIC PANEL, COMPREHENSIVE      TSH 3RD GENERATION   4. Hypercholesterolemia  E78.00 272.0 COLLECTION VENOUS BLOOD,VENIPUNCTURE      CBC WITH AUTOMATED DIFF      LIPID PANEL      METABOLIC PANEL, COMPREHENSIVE      TSH 3RD GENERATION      Patient declines flu, pneumonia vaccinations. COVID up to date, request vaccination records from the pharmacy. Chronic labs today. UA okay. Patient aware of plan of care and verbalized understanding. Questions answered. RTC in 6 months for a check up, or sooner if needed.     Ghulam Monique, NP

## 2021-12-17 NOTE — PATIENT INSTRUCTIONS
Gastroesophageal Reflux Disease (GERD): Care Instructions  Overview     Gastroesophageal reflux disease (GERD) is the backward flow of stomach acid into the esophagus. The esophagus is the tube that leads from your throat to your stomach. A one-way valve prevents the stomach acid from backing up into this tube. But when you have GERD, this valve does not close tightly enough. This can also cause pain and swelling in your esophagus. (This is called esophagitis.)  If you have mild GERD symptoms including heartburn, you may be able to control the problem with antacids or over-the-counter medicine. You can also make lifestyle changes to help reduce your symptoms. These include changing your diet and eating habits, such as not eating late at night and losing weight. Follow-up care is a key part of your treatment and safety. Be sure to make and go to all appointments, and call your doctor if you are having problems. It's also a good idea to know your test results and keep a list of the medicines you take. How can you care for yourself at home? · Take your medicines exactly as prescribed. Call your doctor if you think you are having a problem with your medicine. · Your doctor may recommend over-the-counter medicine. For mild or occasional indigestion, antacids, such as Tums, Gaviscon, Mylanta, or Maalox, may help. Your doctor also may recommend over-the-counter acid reducers, such as famotidine (Pepcid AC), cimetidine (Tagamet HB), or omeprazole (Prilosec). Read and follow all instructions on the label. If you use these medicines often, talk with your doctor. · Change your eating habits. ? It's best to eat several small meals instead of two or three large meals. ? After you eat, wait 2 to 3 hours before you lie down. ? Chocolate, mint, and alcohol can make GERD worse. ? Spicy foods, foods that have a lot of acid (like tomatoes and oranges), and coffee can make GERD symptoms worse in some people.  If your symptoms are worse after you eat a certain food, you may want to stop eating that food to see if your symptoms get better. · Do not smoke or chew tobacco. Smoking can make GERD worse. If you need help quitting, talk to your doctor about stop-smoking programs and medicines. These can increase your chances of quitting for good. · If you have GERD symptoms at night, raise the head of your bed 6 to 8 inches by putting the frame on blocks or placing a foam wedge under the head of your mattress. (Adding extra pillows does not work.)  · Do not wear tight clothing around your middle. · Lose weight if you need to. Losing just 5 to 10 pounds can help. When should you call for help? Call your doctor now or seek immediate medical care if:    · You have new or different belly pain.     · Your stools are black and tarlike or have streaks of blood. Watch closely for changes in your health, and be sure to contact your doctor if:    · Your symptoms have not improved after 2 days.     · Food seems to catch in your throat or chest.   Where can you learn more? Go to http://www.gray.com/  Enter I939 in the search box to learn more about \"Gastroesophageal Reflux Disease (GERD): Care Instructions. \"  Current as of: February 10, 2021               Content Version: 13.0  © 2006-2021 [employer]. Care instructions adapted under license by [employer] (which disclaims liability or warranty for this information). If you have questions about a medical condition or this instruction, always ask your healthcare professional. [place] [address] any warranty or liability for your use of this information.

## 2021-12-18 LAB
ALBUMIN SERPL-MCNC: 3.8 G/DL (ref 3.5–5)
ALBUMIN/GLOB SERPL: 1 {RATIO} (ref 1.1–2.2)
ALP SERPL-CCNC: 67 U/L (ref 45–117)
ALT SERPL-CCNC: 16 U/L (ref 12–78)
ANION GAP SERPL CALC-SCNC: 6 MMOL/L (ref 5–15)
AST SERPL-CCNC: 15 U/L (ref 15–37)
BASOPHILS # BLD: 0.1 K/UL (ref 0–0.1)
BASOPHILS NFR BLD: 1 % (ref 0–1)
BILIRUB SERPL-MCNC: 0.3 MG/DL (ref 0.2–1)
BUN SERPL-MCNC: 14 MG/DL (ref 6–20)
BUN/CREAT SERPL: 20 (ref 12–20)
CALCIUM SERPL-MCNC: 9.7 MG/DL (ref 8.5–10.1)
CHLORIDE SERPL-SCNC: 104 MMOL/L (ref 97–108)
CHOLEST SERPL-MCNC: 182 MG/DL
CO2 SERPL-SCNC: 26 MMOL/L (ref 21–32)
CREAT SERPL-MCNC: 0.71 MG/DL (ref 0.55–1.02)
DIFFERENTIAL METHOD BLD: ABNORMAL
EOSINOPHIL # BLD: 0.1 K/UL (ref 0–0.4)
EOSINOPHIL NFR BLD: 2 % (ref 0–7)
ERYTHROCYTE [DISTWIDTH] IN BLOOD BY AUTOMATED COUNT: 13.4 % (ref 11.5–14.5)
GLOBULIN SER CALC-MCNC: 3.9 G/DL (ref 2–4)
GLUCOSE SERPL-MCNC: 101 MG/DL (ref 65–100)
HCT VFR BLD AUTO: 40.9 % (ref 35–47)
HDLC SERPL-MCNC: 68 MG/DL
HDLC SERPL: 2.7 {RATIO} (ref 0–5)
HGB BLD-MCNC: 12.6 G/DL (ref 11.5–16)
IMM GRANULOCYTES # BLD AUTO: 0 K/UL (ref 0–0.04)
IMM GRANULOCYTES NFR BLD AUTO: 0 % (ref 0–0.5)
LDLC SERPL CALC-MCNC: 96.2 MG/DL (ref 0–100)
LYMPHOCYTES # BLD: 1.5 K/UL (ref 0.8–3.5)
LYMPHOCYTES NFR BLD: 31 % (ref 12–49)
MCH RBC QN AUTO: 27.8 PG (ref 26–34)
MCHC RBC AUTO-ENTMCNC: 30.8 G/DL (ref 30–36.5)
MCV RBC AUTO: 90.3 FL (ref 80–99)
MONOCYTES # BLD: 0.6 K/UL (ref 0–1)
MONOCYTES NFR BLD: 13 % (ref 5–13)
NEUTS SEG # BLD: 2.6 K/UL (ref 1.8–8)
NEUTS SEG NFR BLD: 53 % (ref 32–75)
NRBC # BLD: 0 K/UL (ref 0–0.01)
NRBC BLD-RTO: 0 PER 100 WBC
PLATELET # BLD AUTO: 106 K/UL (ref 150–400)
PMV BLD AUTO: 12.3 FL (ref 8.9–12.9)
POTASSIUM SERPL-SCNC: 4.3 MMOL/L (ref 3.5–5.1)
PROT SERPL-MCNC: 7.7 G/DL (ref 6.4–8.2)
RBC # BLD AUTO: 4.53 M/UL (ref 3.8–5.2)
SODIUM SERPL-SCNC: 136 MMOL/L (ref 136–145)
TRIGL SERPL-MCNC: 89 MG/DL (ref ?–150)
TSH SERPL DL<=0.05 MIU/L-ACNC: 1.63 UIU/ML (ref 0.36–3.74)
VLDLC SERPL CALC-MCNC: 17.8 MG/DL
WBC # BLD AUTO: 4.8 K/UL (ref 3.6–11)

## 2021-12-21 NOTE — PROGRESS NOTES
Patient verified by stating name and date of birth.  Patient informed of lab results and states understanding Dixie Nichols

## 2022-03-18 PROBLEM — I10 ESSENTIAL HYPERTENSION: Status: ACTIVE | Noted: 2018-12-20

## 2022-03-18 PROBLEM — R20.0 PERIORAL NUMBNESS: Status: ACTIVE | Noted: 2018-11-12

## 2022-03-19 PROBLEM — R21 RASH AND NONSPECIFIC SKIN ERUPTION: Status: ACTIVE | Noted: 2019-07-22

## 2022-03-19 PROBLEM — M54.9 ACUTE BILATERAL BACK PAIN: Status: ACTIVE | Noted: 2018-11-12

## 2022-03-19 PROBLEM — M62.838 MUSCLE SPASM: Status: ACTIVE | Noted: 2019-02-28

## 2022-03-19 PROBLEM — N30.00 ACUTE CYSTITIS WITHOUT HEMATURIA: Status: ACTIVE | Noted: 2019-07-22

## 2022-06-03 ENCOUNTER — OFFICE VISIT (OUTPATIENT)
Dept: FAMILY MEDICINE CLINIC | Age: 82
End: 2022-06-03
Payer: MEDICARE

## 2022-06-03 VITALS
DIASTOLIC BLOOD PRESSURE: 72 MMHG | HEART RATE: 83 BPM | OXYGEN SATURATION: 98 % | WEIGHT: 131.6 LBS | HEIGHT: 67 IN | SYSTOLIC BLOOD PRESSURE: 130 MMHG | BODY MASS INDEX: 20.65 KG/M2 | RESPIRATION RATE: 18 BRPM | TEMPERATURE: 97.6 F

## 2022-06-03 DIAGNOSIS — Z00.00 MEDICARE ANNUAL WELLNESS VISIT, SUBSEQUENT: Primary | ICD-10-CM

## 2022-06-03 DIAGNOSIS — R30.0 DYSURIA: ICD-10-CM

## 2022-06-03 DIAGNOSIS — R82.90 URINE ABNORMALITY: ICD-10-CM

## 2022-06-03 LAB
BILIRUB UR QL STRIP: NEGATIVE
GLUCOSE UR-MCNC: NEGATIVE MG/DL
KETONES P FAST UR STRIP-MCNC: NEGATIVE MG/DL
PH UR STRIP: 7 [PH] (ref 4.6–8)
PROT UR QL STRIP: NEGATIVE
SP GR UR STRIP: 1.02 (ref 1–1.03)
UA UROBILINOGEN AMB POC: NORMAL (ref 0.2–1)
URINALYSIS CLARITY POC: CLEAR
URINALYSIS COLOR POC: YELLOW
URINE BLOOD POC: NEGATIVE
URINE LEUKOCYTES POC: NORMAL
URINE NITRITES POC: NEGATIVE

## 2022-06-03 PROCEDURE — G0439 PPPS, SUBSEQ VISIT: HCPCS | Performed by: NURSE PRACTITIONER

## 2022-06-03 PROCEDURE — 99214 OFFICE O/P EST MOD 30 MIN: CPT | Performed by: NURSE PRACTITIONER

## 2022-06-03 PROCEDURE — 81002 URINALYSIS NONAUTO W/O SCOPE: CPT | Performed by: NURSE PRACTITIONER

## 2022-06-03 RX ORDER — NITROFURANTOIN 25; 75 MG/1; MG/1
100 CAPSULE ORAL 2 TIMES DAILY
Qty: 10 CAPSULE | Refills: 0 | Status: SHIPPED | OUTPATIENT
Start: 2022-06-03 | End: 2022-06-08

## 2022-06-03 NOTE — PROGRESS NOTES
1. \"Have you been to the ER, urgent care clinic since your last visit? Hospitalized since your last visit? \" No    2. \"Have you seen or consulted any other health care providers outside of the [address] [place] [address] since your last visit? \" No     3. For patients aged 39-70: Has the patient had a colonoscopy / FIT/ Cologuard? Yes - Care Gap present. Most recent result on file      If the patient is female:    4. For patients aged 41-77: Has the patient had a mammogram within the past 2 years? Yes - Care Gap present. Most recent result on file      5. For patients aged 21-65: Has the patient had a pap smear?  NA - based on age or sex

## 2022-06-03 NOTE — PROGRESS NOTES
Chief Complaint   Patient presents with    Annual Wellness Visit       HPI:    Sherly Ralph is a 78 y.o. female who presents to the clinic for an acute visit and AWV. HTN: Compliant with telmisartan. Does not routinely monitor BP at home. Denies HA, CP. GERD: Prevacid changed to pantoprazole after her appendectomy. New issues: In today with a CC of dysuria x 4 days. She has tried AZO this week with some improvement. Last UTI 1 year ago. No Known Allergies    Current Outpatient Medications   Medication Sig    nitrofurantoin, macrocrystal-monohydrate, (Macrobid) 100 mg capsule Take 1 Capsule by mouth two (2) times a day for 5 days.  pantoprazole (PROTONIX) 40 mg tablet Take 1 Tablet by mouth daily.  telmisartan (MICARDIS) 20 mg tablet TAKE 1 TABLET EVERY DAY     No current facility-administered medications for this visit. Past Medical History:   Diagnosis Date    Encounter for monitoring primary estrogen replacement therapy     GERD (gastroesophageal reflux disease)     Hypercholesterolemia        Family History   Problem Relation Age of Onset    Cancer Father 76        throat    Diabetes Sister     Heart Disease Sister     Cancer Brother 66        pancreatis    Coronary Art Dis Brother        ROS:  Denies fever, chills, cough, chest pain, SOB,  nausea, vomiting, diarrhea, + dysuria. Denies rashes, wounds, arthralgias, weakness, numbness, visual changes, depression, wt loss, wt gain, hemoptysis, hematochezia or melena. Patient is not experiencing chest pain radiating to the jaw and/or down the arms.     Physical Examination:    /72 (BP 1 Location: Right arm, BP Patient Position: Sitting, BP Cuff Size: Adult long)   Pulse 83   Temp 97.6 °F (36.4 °C) (Temporal)   Resp 18   Ht 5' 7\" (1.702 m)   Wt 131 lb 9.6 oz (59.7 kg)   SpO2 98%   BMI 20.61 kg/m²     Wt Readings from Last 3 Encounters:   06/03/22 131 lb 9.6 oz (59.7 kg)   12/17/21 131 lb 9.6 oz (59.7 kg)   04/13/21 137 lb (62.1 kg)     Constitutional: WDWN Female in no acute distress  HEENT: NC/AT, PERRL, TMs pearly gray, OP: normal  Respiratory:  Respirations even and unlabored without use of accessory muscles, CTA throughout without wheezes, rales, rubs or rhonchi. Symmetrical chest expansion. Cardiac: RRR no clicks, murmurs, gallops, or rubs  Musculoskeletal:  No cyanosis, clubbing or edema of extremities. Moves all extremities without difficulty. Neurologic:  Smooth, even gait without assistance, CN 2-12 grossly intact. Skin: intact and warm to the touch, no rash   Lymphadenopathy: no cervical or supraclavicular nodes  Psych: Pleasant and appropriate. Judgment normal. Alert and oriented x 3. ASSESSMENT AND PLAN:       ICD-10-CM ICD-9-CM    1. Medicare annual wellness visit, subsequent  Z00.00 V70.0    2. Urine abnormality  R82.90 791.9 AMB POC URINALYSIS DIP STICK MANUAL W/O MICRO      CULTURE, URINE      CULTURE, URINE   3. Dysuria  R30.0 788.1 nitrofurantoin, macrocrystal-monohydrate, (Macrobid) 100 mg capsule      Patient declines vaccinations. See attached AWV. UA unremarkable. Send for culture and tx symptoms with above medications. Take antibiotic to completion. Increase fluids to 2 liters daily of mostly water. Void with urge and after intercourse. Monitor temperature. Return if no better in 48 hours. To ER with high fever greater than 103, vomiting, abdominal pain, diarrhea, dehydration, not voiding greater than 6 hours, severe back or flank pain. Patient aware of plan of care and verbalized understanding. Questions answered. RTC PRN.      Bev Green NP

## 2022-06-03 NOTE — PROGRESS NOTES
This is the Subsequent Medicare Annual Wellness Exam, performed 12 months or more after the Initial AWV or the last Subsequent AWV    I have reviewed the patient's medical history in detail and updated the computerized patient record. Assessment/Plan   Education and counseling provided:  Are appropriate based on today's review and evaluation    1. Medicare annual wellness visit, subsequent  2. Urine abnormality  -     AMB POC URINALYSIS DIP STICK MANUAL W/O MICRO  -     CULTURE, URINE; Future  3. Dysuria  -     nitrofurantoin, macrocrystal-monohydrate, (Macrobid) 100 mg capsule; Take 1 Capsule by mouth two (2) times a day for 5 days. , Normal, Disp-10 Capsule, R-0       Depression Risk Factor Screening:     3 most recent PHQ Screens 6/3/2022   Little interest or pleasure in doing things Not at all   Feeling down, depressed, irritable, or hopeless Not at all   Total Score PHQ 2 0   Trouble falling or staying asleep, or sleeping too much -   Feeling tired or having little energy -   Poor appetite, weight loss, or overeating -   Feeling bad about yourself - or that you are a failure or have let yourself or your family down -   Trouble concentrating on things such as school, work, reading, or watching TV -   Moving or speaking so slowly that other people could have noticed; or the opposite being so fidgety that others notice -   Thoughts of being better off dead, or hurting yourself in some way -   PHQ 9 Score -       Alcohol & Drug Abuse Risk Screen    Do you average more than 1 drink per night or more than 7 drinks a week:  No    On any one occasion in the past three months have you have had more than 3 drinks containing alcohol:  No          Functional Ability and Level of Safety:    Hearing: Hearing is good. Activities of Daily Living: The home contains: no safety equipment.   Patient does total self care      Ambulation: with no difficulty     Fall Risk:  Fall Risk Assessment, last 12 mths 6/3/2022 Able to walk? Yes   Fall in past 12 months? 0   Do you feel unsteady? 0   Are you worried about falling 0      Abuse Screen:  Patient is not abused       Cognitive Screening    Has your family/caregiver stated any concerns about your memory: no    Cognitive Screening: Normal - MMSE (Mini Mental Status Exam)    Health Maintenance Due     Health Maintenance Due   Topic Date Due    DTaP/Tdap/Td series (1 - Tdap) Never done    Shingrix Vaccine Age 50> (1 of 2) Never done    Bone Densitometry (Dexa) Screening  Never done    Pneumococcal 65+ years (1 - PCV) Never done    COVID-19 Vaccine (3 - Booster) 03/14/2022       Patient Care Team   Patient Care Team:  Christi Sams NP as PCP - General (Nurse Practitioner)  Christi Sams [facility] as PCP [facility]  Brendan Dill MD (Cardiovascular Disease Physician)  Brendan Dill MD (Cardiovascular Disease Physician)    History     Patient Active Problem List   Diagnosis Code    GERD (gastroesophageal reflux disease) K21.9    Hypercholesterolemia E78.00    Spasmodic torticollis G24.3    Acute bilateral back pain M54.9    Perioral numbness R20.0    Essential hypertension I10    Muscle spasm M62.838    Acute cystitis without hematuria N30.00    Rash and nonspecific skin eruption R21     Past Medical History:   Diagnosis Date    Encounter for monitoring primary estrogen replacement therapy     GERD (gastroesophageal reflux disease)     Hypercholesterolemia       Past Surgical History:   Procedure Laterality Date    HX APPENDECTOMY  08/2021    HX COLONOSCOPY  2010    HX ENDOSCOPY  2010    HX GYN      hysterectomy partial    HX HEENT      cataract both eyes     Current Outpatient Medications   Medication Sig Dispense Refill    nitrofurantoin, macrocrystal-monohydrate, (Macrobid) 100 mg capsule Take 1 Capsule by mouth two (2) times a day for 5 days. 10 Capsule 0    pantoprazole (PROTONIX) 40 mg tablet Take 1 Tablet by mouth daily. 90 Tablet 3    telmisartan (MICARDIS) 20 mg tablet TAKE 1 TABLET EVERY DAY 90 Tablet 3     No Known Allergies    Family History   Problem Relation Age of Onset    Cancer Father 76        throat    Diabetes Sister     Heart Disease Sister     Cancer Brother 66        pancreatis    Coronary Art Dis Brother      Social History     Tobacco Use    Smoking status: Never Smoker    Smokeless tobacco: Never Used   Substance Use Topics    Alcohol use: Not on file       Heather Brilliant

## 2022-06-03 NOTE — PATIENT INSTRUCTIONS
The best way to stay healthy is to live a healthy lifestyle. A healthy lifestyle includes regular exercise, eating a well-balanced diet, keeping a healthy weight and not smoking. Regular physical exams and screening tests are another important way to take care of yourself. Preventive exams provided by health care providers can find health problems early when treatment works best and can keep you from getting certain diseases or illnesses. Preventive services include exams, lab tests, screenings, shots, monitoring and information to help you take care of your own health. All people over 65 should have a pneumonia shot. Pneumonia shots are usually only needed once in a lifetime unless your doctor decides differently. In addition to your physical exam, some screening tests are recommended:    All people over 65 should have a yearly flu shot. People over 65 are at medium to high risk for Hepatitis B. Three shots are needed for complete protection. Bone mass measurement (dexa scan) is recommended every two years. Diabetes Mellitus screening is recommended every year. Glaucoma is an eye disease caused by high pressure in the eye. An eye exam is recommended every year. Cardiovascular screening tests that check your cholesterol and other blood fat (lipid) levels are recommended every five years. Colorectal Cancer screening tests help to find pre-cancerous polyps (growths in the colon) so they can be removed before they turn into cancer. Tests ordered for screening depend on your personal and family history risk factors. Prostate Cancer Screening (annually up to age 76)    Screening for breast cancer is recommended yearly with a Mammogram.    Screening for cervical and vaginal cancer is recommended with a pelvic and Pap test every two years.  However if you have had an abnormal pap in the past  three years or at high risk for cervical or vaginal cancer Medicare will cover a pap test and a pelvic exam every year.      Here is a list of your current Health Maintenance items with a due date:  Health Maintenance Due   Topic Date Due    DTaP/Tdap/Td  (1 - Tdap) Never done    Shingles Vaccine (1 of 2) Never done    Bone Mineral Density   Never done    Pneumococcal Vaccine (1 - PCV) Never done    COVID-19 Vaccine (3 - Booster) 03/14/2022    Annual Well Visit  04/14/2022

## 2022-06-06 LAB
BACTERIA SPEC CULT: ABNORMAL
CC UR VC: ABNORMAL
SERVICE CMNT-IMP: ABNORMAL

## 2022-06-06 NOTE — PROGRESS NOTES
Patient verified by stating name and date of birth.  Patient informed of lab results and states understanding per Emmanuel Garg

## 2022-09-01 DIAGNOSIS — K21.9 GASTROESOPHAGEAL REFLUX DISEASE WITHOUT ESOPHAGITIS: ICD-10-CM

## 2022-09-01 RX ORDER — PANTOPRAZOLE SODIUM 40 MG/1
40 TABLET, DELAYED RELEASE ORAL DAILY
Qty: 90 TABLET | Refills: 3 | Status: SHIPPED | OUTPATIENT
Start: 2022-09-01

## 2023-01-03 ENCOUNTER — VIRTUAL VISIT (OUTPATIENT)
Dept: FAMILY MEDICINE CLINIC | Age: 83
End: 2023-01-03
Payer: MEDICARE

## 2023-01-03 DIAGNOSIS — R82.90 URINE ABNORMALITY: Primary | ICD-10-CM

## 2023-01-03 LAB
BILIRUB UR QL STRIP: NEGATIVE
GLUCOSE UR-MCNC: NEGATIVE MG/DL
KETONES P FAST UR STRIP-MCNC: NEGATIVE MG/DL
PH UR STRIP: 5.5 [PH] (ref 4.6–8)
PROT UR QL STRIP: NEGATIVE
SP GR UR STRIP: 1.02 (ref 1–1.03)
UA UROBILINOGEN AMB POC: NORMAL (ref 0.2–1)
URINALYSIS CLARITY POC: CLEAR
URINALYSIS COLOR POC: YELLOW
URINE BLOOD POC: NEGATIVE
URINE LEUKOCYTES POC: NORMAL
URINE NITRITES POC: NEGATIVE

## 2023-01-03 PROCEDURE — G2025 DIS SITE TELE SVCS RHC/FQHC: HCPCS | Performed by: NURSE PRACTITIONER

## 2023-01-03 PROCEDURE — 1101F PT FALLS ASSESS-DOCD LE1/YR: CPT | Performed by: NURSE PRACTITIONER

## 2023-01-03 PROCEDURE — 81002 URINALYSIS NONAUTO W/O SCOPE: CPT | Performed by: NURSE PRACTITIONER

## 2023-01-03 RX ORDER — AMOXICILLIN AND CLAVULANATE POTASSIUM 875; 125 MG/1; MG/1
1 TABLET, FILM COATED ORAL EVERY 12 HOURS
Qty: 20 TABLET | Refills: 0 | Status: SHIPPED | OUTPATIENT
Start: 2023-01-03 | End: 2023-01-13

## 2023-01-03 NOTE — PROGRESS NOTES
Chief Complaint   Patient presents with    Bladder Infection     1. Have you been to the ER, urgent care clinic since your last visit? Hospitalized since your last visit? No    2. Have you seen or consulted any other health care providers outside of the [address] [place] [address] since your last visit? Include any pap smears or colon screening. No  Abuse Screening Questionnaire 1/3/2023   Do you ever feel afraid of your partner? N   Are you in a relationship with someone who physically or mentally threatens you? N   Is it safe for you to go home? Y     3 most recent PHQ Screens 1/3/2023   Little interest or pleasure in doing things Not at all   Feeling down, depressed, irritable, or hopeless Not at all   Total Score PHQ 2 0   Trouble falling or staying asleep, or sleeping too much -   Feeling tired or having little energy -   Poor appetite, weight loss, or overeating -   Feeling bad about yourself - or that you are a failure or have let yourself or your family down -   Trouble concentrating on things such as school, work, reading, or watching TV -   Moving or speaking so slowly that other people could have noticed; or the opposite being so fidgety that others notice -   Thoughts of being better off dead, or hurting yourself in some way -   PHQ 9 Score -     Learning Assessment 1/3/2023   PRIMARY LEARNER Patient   HIGHEST LEVEL OF EDUCATION - PRIMARY LEARNER  GRADUATED HIGH SCHOOL OR GED   BARRIERS PRIMARY LEARNER NONE   CO-LEARNER CAREGIVER No   PRIMARY LANGUAGE ENGLISH   LEARNER PREFERENCE PRIMARY READING     -   ANSWERED BY Patient   RELATIONSHIP SELF     Fall Risk Assessment, last 12 mths 1/3/2023   Able to walk? Yes   Fall in past 12 months? 0   Do you feel unsteady?  0   Are you worried about falling 0 [Negative] : Allergic/Immunologic

## 2023-01-03 NOTE — PROGRESS NOTES
Yoan Juan is a 78 y.o. female evaluated via telephone on 1/3/2023. Consent: Yoan Juan was evaluated through a patient-initiated, synchronous (real-time) audio only encounter. She (or guardian if applicable) is aware that it is a billable service, which includes applicable co-pays, with coverage as determined by her insurance carrier. This visit was conducted with the patient's (and/or Raúl Aggarwal guardian's) verbal consent. She has not had a related appointment within my department in the past 7 days or scheduled within the next 24 hours. The patient was located in a state where the provider was licensed to provide care. The patient was located at: Home: [facility]  The provider was located at: Facility ([facility] [place] [id number] [place] [id number]      Documentation:  I communicated with the patient and/or health care decision maker about UTI symptoms that started over the weekend. She reports urinary frequency, hesitancy, cloudy urine. Hx of UTIs. She admits to not being much of a water drinker. Details of this discussion including any medical advice provided: I recommended pushing fluids, voiding often. Rx Augmentin and send sample for culture. If no improvement in a week, I'd like to see her in the office. She understands. I affirm this is a Patient Initiated Episode with an Established Patient who has not had a related appointment within my department in the past 7 days or scheduled within the next 24 hours. ASSESSMENT AND PLAN:       ICD-10-CM ICD-9-CM    1. Urine abnormality  R82.90 791.9 AMB POC URINALYSIS DIP STICK MANUAL W/O MICRO      CULTURE, URINE      CULTURE, URINE          Total Time: minutes: 11-20 minutes    Patient aware of plan of care and verbalized understanding. Questions answered. RTC PRN.      Parag Limon NP

## 2023-01-05 LAB
BACTERIA SPEC CULT: NORMAL
SERVICE CMNT-IMP: NORMAL

## 2023-01-05 NOTE — PROGRESS NOTES
Patient verified by stating name and date of birth.  Patient informed of lab results and states understanding per Elis De Santiago

## 2023-01-05 NOTE — PROGRESS NOTES
Please call, urine culture was normal. Ok to stop antibiotics. How is she feeling? If she's still having symptoms, please set her up to see me in person next week.

## 2023-05-23 ENCOUNTER — OFFICE VISIT (OUTPATIENT)
Age: 83
End: 2023-05-23
Payer: COMMERCIAL

## 2023-05-23 VITALS
RESPIRATION RATE: 17 BRPM | OXYGEN SATURATION: 97 % | HEART RATE: 94 BPM | SYSTOLIC BLOOD PRESSURE: 138 MMHG | DIASTOLIC BLOOD PRESSURE: 74 MMHG | BODY MASS INDEX: 20.65 KG/M2 | WEIGHT: 131.6 LBS | HEIGHT: 67 IN

## 2023-05-23 DIAGNOSIS — J06.9 VIRAL URI: Primary | ICD-10-CM

## 2023-05-23 PROCEDURE — 1123F ACP DISCUSS/DSCN MKR DOCD: CPT

## 2023-05-23 PROCEDURE — 3075F SYST BP GE 130 - 139MM HG: CPT

## 2023-05-23 PROCEDURE — 3078F DIAST BP <80 MM HG: CPT

## 2023-05-23 PROCEDURE — G8427 DOCREV CUR MEDS BY ELIG CLIN: HCPCS

## 2023-05-23 PROCEDURE — G8400 PT W/DXA NO RESULTS DOC: HCPCS

## 2023-05-23 PROCEDURE — G8420 CALC BMI NORM PARAMETERS: HCPCS

## 2023-05-23 PROCEDURE — 99213 OFFICE O/P EST LOW 20 MIN: CPT

## 2023-05-23 PROCEDURE — 1090F PRES/ABSN URINE INCON ASSESS: CPT

## 2023-05-23 PROCEDURE — 1036F TOBACCO NON-USER: CPT

## 2023-05-23 SDOH — ECONOMIC STABILITY: FOOD INSECURITY: WITHIN THE PAST 12 MONTHS, THE FOOD YOU BOUGHT JUST DIDN'T LAST AND YOU DIDN'T HAVE MONEY TO GET MORE.: NEVER TRUE

## 2023-05-23 SDOH — HEALTH STABILITY: PHYSICAL HEALTH: ON AVERAGE, HOW MANY MINUTES DO YOU ENGAGE IN EXERCISE AT THIS LEVEL?: 0 MIN

## 2023-05-23 SDOH — ECONOMIC STABILITY: INCOME INSECURITY: IN THE LAST 12 MONTHS, WAS THERE A TIME WHEN YOU WERE NOT ABLE TO PAY THE MORTGAGE OR RENT ON TIME?: NO

## 2023-05-23 SDOH — ECONOMIC STABILITY: FOOD INSECURITY: WITHIN THE PAST 12 MONTHS, YOU WORRIED THAT YOUR FOOD WOULD RUN OUT BEFORE YOU GOT MONEY TO BUY MORE.: NEVER TRUE

## 2023-05-23 SDOH — ECONOMIC STABILITY: TRANSPORTATION INSECURITY
IN THE PAST 12 MONTHS, HAS LACK OF TRANSPORTATION KEPT YOU FROM MEETINGS, WORK, OR FROM GETTING THINGS NEEDED FOR DAILY LIVING?: NO

## 2023-05-23 SDOH — ECONOMIC STABILITY: TRANSPORTATION INSECURITY
IN THE PAST 12 MONTHS, HAS THE LACK OF TRANSPORTATION KEPT YOU FROM MEDICAL APPOINTMENTS OR FROM GETTING MEDICATIONS?: NO

## 2023-05-23 SDOH — HEALTH STABILITY: PHYSICAL HEALTH: ON AVERAGE, HOW MANY DAYS PER WEEK DO YOU ENGAGE IN MODERATE TO STRENUOUS EXERCISE (LIKE A BRISK WALK)?: 0 DAYS

## 2023-05-23 SDOH — ECONOMIC STABILITY: HOUSING INSECURITY
IN THE LAST 12 MONTHS, WAS THERE A TIME WHEN YOU DID NOT HAVE A STEADY PLACE TO SLEEP OR SLEPT IN A SHELTER (INCLUDING NOW)?: NO

## 2023-05-23 ASSESSMENT — SOCIAL DETERMINANTS OF HEALTH (SDOH)
WITHIN THE LAST YEAR, HAVE YOU BEEN KICKED, HIT, SLAPPED, OR OTHERWISE PHYSICALLY HURT BY YOUR PARTNER OR EX-PARTNER?: NO
WITHIN THE LAST YEAR, HAVE YOU BEEN HUMILIATED OR EMOTIONALLY ABUSED IN OTHER WAYS BY YOUR PARTNER OR EX-PARTNER?: NO
HOW HARD IS IT FOR YOU TO PAY FOR THE VERY BASICS LIKE FOOD, HOUSING, MEDICAL CARE, AND HEATING?: VERY HARD
WITHIN THE LAST YEAR, HAVE YOU BEEN AFRAID OF YOUR PARTNER OR EX-PARTNER?: NO
HOW OFTEN DO YOU GET TOGETHER WITH FRIENDS OR RELATIVES?: ONCE A WEEK
HOW OFTEN DO YOU ATTENT MEETINGS OF THE CLUB OR ORGANIZATION YOU BELONG TO?: NEVER
WITHIN THE LAST YEAR, HAVE TO BEEN RAPED OR FORCED TO HAVE ANY KIND OF SEXUAL ACTIVITY BY YOUR PARTNER OR EX-PARTNER?: NO
IN A TYPICAL WEEK, HOW MANY TIMES DO YOU TALK ON THE PHONE WITH FAMILY, FRIENDS, OR NEIGHBORS?: THREE TIMES A WEEK
DO YOU BELONG TO ANY CLUBS OR ORGANIZATIONS SUCH AS CHURCH GROUPS UNIONS, FRATERNAL OR ATHLETIC GROUPS, OR SCHOOL GROUPS?: NO

## 2023-05-23 ASSESSMENT — ENCOUNTER SYMPTOMS
RHINORRHEA: 1
SORE THROAT: 1
COUGH: 1
SHORTNESS OF BREATH: 0
WHEEZING: 0

## 2023-05-23 ASSESSMENT — ANXIETY QUESTIONNAIRES
6. BECOMING EASILY ANNOYED OR IRRITABLE: 0
7. FEELING AFRAID AS IF SOMETHING AWFUL MIGHT HAPPEN: 0
1. FEELING NERVOUS, ANXIOUS, OR ON EDGE: 0
GAD7 TOTAL SCORE: 0
5. BEING SO RESTLESS THAT IT IS HARD TO SIT STILL: 0
4. TROUBLE RELAXING: 0
2. NOT BEING ABLE TO STOP OR CONTROL WORRYING: 0
3. WORRYING TOO MUCH ABOUT DIFFERENT THINGS: 0

## 2023-05-23 ASSESSMENT — LIFESTYLE VARIABLES
HOW MANY STANDARD DRINKS CONTAINING ALCOHOL DO YOU HAVE ON A TYPICAL DAY: PATIENT DOES NOT DRINK
HOW OFTEN DO YOU HAVE A DRINK CONTAINING ALCOHOL: NEVER

## 2023-05-23 ASSESSMENT — PATIENT HEALTH QUESTIONNAIRE - PHQ9
SUM OF ALL RESPONSES TO PHQ QUESTIONS 1-9: 0
SUM OF ALL RESPONSES TO PHQ QUESTIONS 1-9: 0
2. FEELING DOWN, DEPRESSED OR HOPELESS: 0
SUM OF ALL RESPONSES TO PHQ QUESTIONS 1-9: 0
SUM OF ALL RESPONSES TO PHQ9 QUESTIONS 1 & 2: 0
SUM OF ALL RESPONSES TO PHQ QUESTIONS 1-9: 0
1. LITTLE INTEREST OR PLEASURE IN DOING THINGS: 0

## 2023-05-23 NOTE — PATIENT INSTRUCTIONS
You can take Tylenol 650mg every 6 hours as needed for discomfort. You can Mucinex 600mg every 6 hours as needed for cough and congestion. Drink plenty fluids and rest as much as possible.

## 2023-05-23 NOTE — PROGRESS NOTES
1. \"Have you been to the ER, urgent care clinic since your last visit? Hospitalized since your last visit? \" No    2. \"Have you seen or consulted any other health care providers outside of the [address] [place] [address] since your last visit? \" No     3. For patients aged 39-70: Has the patient had a colonoscopy / FIT/ Cologuard? NA - based on age     If the patient is female:    4. For patients aged 41-77: Has the patient had a mammogram within the past 2 years? NA - based on age    11. For patients aged 21-65: Has the patient had a pap smear?  NA - based on age    Chief Complaint   Patient presents with    Pharyngitis    Cough     mucus     Vitals:    05/23/23 0944   BP: 138/74   Pulse: 94   Resp: 17   SpO2: 97%

## 2023-05-23 NOTE — PROGRESS NOTES
Chief Complaint   Patient presents with    Pharyngitis    Cough     mucus       HPI:     is a 78 y.o. female who presents for an acute visit. 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 She has not been taking anything for her symptoms. Not on File    Current Outpatient Medications   Medication Sig Dispense Refill    pantoprazole (PROTONIX) 40 MG tablet Take 1 tablet by mouth daily      telmisartan (MICARDIS) 20 MG tablet Take 1 tablet by mouth daily       No current facility-administered medications for this visit. Past Medical History:   Diagnosis Date    Encounter for monitoring primary estrogen replacement therapy     GERD (gastroesophageal reflux disease)     Hypercholesterolemia        Family History   Problem Relation Age of Onset    Cancer Brother 66        pancreatis    Coronary Art Dis Brother     Heart Disease Sister     Cancer Father 76        throat    Diabetes Sister        Review of Systems   Constitutional:  Positive for chills. Negative for fatigue and fever. HENT:  Positive for congestion, rhinorrhea and sore throat. Respiratory:  Positive for cough. Negative for shortness of breath and wheezing. Cardiovascular:  Negative for chest pain and palpitations. Skin:  Negative for rash. Neurological:  Negative for dizziness and headaches.         Vitals:    05/23/23 0944   BP: 138/74   Pulse: 94   Resp: 17   SpO2: 97%       Wt Readings from Last 3 Encounters:   05/23/23 131 lb 9.6 oz (59.7 kg)   06/03/22 131 lb 9.6 oz (59.7 kg)   12/17/21 131 lb 9.6 oz (59.7 kg)       PHQ-9  5/23/2023   Little interest or pleasure in doing things 0   Little interest or pleasure in doing things -   Feeling down, depressed, or hopeless 0   Trouble falling or staying asleep, or sleeping too much -   Trouble falling or staying asleep, or sleeping too much

## 2023-05-25 ENCOUNTER — TELEPHONE (OUTPATIENT)
Age: 83
End: 2023-05-25

## 2023-05-25 DIAGNOSIS — J06.9 VIRAL URI: Primary | ICD-10-CM

## 2023-05-25 NOTE — TELEPHONE ENCOUNTER
Megan saw Bertrand Hernandezn 5-23. She states she now has a bad cough with some occasional yellow mucus. Would like to know if some cough syrup can be called into [facility].

## 2023-05-26 RX ORDER — DEXTROMETHORPHAN HYDROBROMIDE AND PROMETHAZINE HYDROCHLORIDE 15; 6.25 MG/5ML; MG/5ML
5 SYRUP ORAL 4 TIMES DAILY PRN
Qty: 118 ML | Refills: 0 | Status: SHIPPED | OUTPATIENT
Start: 2023-05-26 | End: 2023-06-02

## 2023-07-13 RX ORDER — PANTOPRAZOLE SODIUM 40 MG/1
TABLET, DELAYED RELEASE ORAL
Qty: 90 TABLET | Refills: 3 | Status: SHIPPED | OUTPATIENT
Start: 2023-07-13

## 2023-09-29 ENCOUNTER — OFFICE VISIT (OUTPATIENT)
Age: 83
End: 2023-09-29
Payer: MEDICARE

## 2023-09-29 VITALS
OXYGEN SATURATION: 98 % | WEIGHT: 131.2 LBS | BODY MASS INDEX: 20.59 KG/M2 | HEIGHT: 67 IN | RESPIRATION RATE: 20 BRPM | TEMPERATURE: 98.4 F | HEART RATE: 78 BPM | DIASTOLIC BLOOD PRESSURE: 82 MMHG | SYSTOLIC BLOOD PRESSURE: 132 MMHG

## 2023-09-29 DIAGNOSIS — E55.9 VITAMIN D DEFICIENCY: ICD-10-CM

## 2023-09-29 DIAGNOSIS — K21.9 GASTROESOPHAGEAL REFLUX DISEASE, UNSPECIFIED WHETHER ESOPHAGITIS PRESENT: ICD-10-CM

## 2023-09-29 DIAGNOSIS — Z00.00 MEDICARE ANNUAL WELLNESS VISIT, SUBSEQUENT: Primary | ICD-10-CM

## 2023-09-29 DIAGNOSIS — I10 PRIMARY HYPERTENSION: ICD-10-CM

## 2023-09-29 LAB
ALBUMIN SERPL-MCNC: 3.9 G/DL (ref 3.5–5)
ALBUMIN/GLOB SERPL: 1 (ref 1.1–2.2)
ALP SERPL-CCNC: 66 U/L (ref 45–117)
ALT SERPL-CCNC: 20 U/L (ref 12–78)
ANION GAP SERPL CALC-SCNC: 5 MMOL/L (ref 5–15)
AST SERPL-CCNC: 18 U/L (ref 15–37)
BASOPHILS # BLD: 0.1 K/UL (ref 0–0.1)
BASOPHILS NFR BLD: 1 % (ref 0–1)
BILIRUB SERPL-MCNC: 0.4 MG/DL (ref 0.2–1)
BUN SERPL-MCNC: 12 MG/DL (ref 6–20)
BUN/CREAT SERPL: 17 (ref 12–20)
CALCIUM SERPL-MCNC: 10.3 MG/DL (ref 8.5–10.1)
CHLORIDE SERPL-SCNC: 101 MMOL/L (ref 97–108)
CHOLEST SERPL-MCNC: 194 MG/DL
CO2 SERPL-SCNC: 28 MMOL/L (ref 21–32)
CREAT SERPL-MCNC: 0.72 MG/DL (ref 0.55–1.02)
DIFFERENTIAL METHOD BLD: NORMAL
EOSINOPHIL # BLD: 0.1 K/UL (ref 0–0.4)
EOSINOPHIL NFR BLD: 1 % (ref 0–7)
ERYTHROCYTE [DISTWIDTH] IN BLOOD BY AUTOMATED COUNT: 13.3 % (ref 11.5–14.5)
GLOBULIN SER CALC-MCNC: 4 G/DL (ref 2–4)
GLUCOSE SERPL-MCNC: 83 MG/DL (ref 65–100)
HCT VFR BLD AUTO: 40.3 % (ref 35–47)
HDLC SERPL-MCNC: 81 MG/DL
HDLC SERPL: 2.4 (ref 0–5)
HGB BLD-MCNC: 13.2 G/DL (ref 11.5–16)
IMM GRANULOCYTES # BLD AUTO: 0 K/UL (ref 0–0.04)
IMM GRANULOCYTES NFR BLD AUTO: 0 % (ref 0–0.5)
LDLC SERPL CALC-MCNC: 94.2 MG/DL (ref 0–100)
LYMPHOCYTES # BLD: 1.6 K/UL (ref 0.8–3.5)
LYMPHOCYTES NFR BLD: 25 % (ref 12–49)
MCH RBC QN AUTO: 28.6 PG (ref 26–34)
MCHC RBC AUTO-ENTMCNC: 32.8 G/DL (ref 30–36.5)
MCV RBC AUTO: 87.4 FL (ref 80–99)
MONOCYTES # BLD: 0.6 K/UL (ref 0–1)
MONOCYTES NFR BLD: 10 % (ref 5–13)
NEUTS SEG # BLD: 3.9 K/UL (ref 1.8–8)
NEUTS SEG NFR BLD: 63 % (ref 32–75)
NRBC # BLD: 0 K/UL (ref 0–0.01)
NRBC BLD-RTO: 0 PER 100 WBC
PLATELET # BLD AUTO: 193 K/UL (ref 150–400)
PMV BLD AUTO: 10.1 FL (ref 8.9–12.9)
POTASSIUM SERPL-SCNC: 4 MMOL/L (ref 3.5–5.1)
PROT SERPL-MCNC: 7.9 G/DL (ref 6.4–8.2)
RBC # BLD AUTO: 4.61 M/UL (ref 3.8–5.2)
SODIUM SERPL-SCNC: 134 MMOL/L (ref 136–145)
TRIGL SERPL-MCNC: 94 MG/DL
VLDLC SERPL CALC-MCNC: 18.8 MG/DL
WBC # BLD AUTO: 6.3 K/UL (ref 3.6–11)

## 2023-09-29 PROCEDURE — G8420 CALC BMI NORM PARAMETERS: HCPCS | Performed by: NURSE PRACTITIONER

## 2023-09-29 PROCEDURE — 3079F DIAST BP 80-89 MM HG: CPT | Performed by: NURSE PRACTITIONER

## 2023-09-29 PROCEDURE — 1090F PRES/ABSN URINE INCON ASSESS: CPT | Performed by: NURSE PRACTITIONER

## 2023-09-29 PROCEDURE — 3075F SYST BP GE 130 - 139MM HG: CPT | Performed by: NURSE PRACTITIONER

## 2023-09-29 PROCEDURE — G8400 PT W/DXA NO RESULTS DOC: HCPCS | Performed by: NURSE PRACTITIONER

## 2023-09-29 PROCEDURE — G0439 PPPS, SUBSEQ VISIT: HCPCS | Performed by: NURSE PRACTITIONER

## 2023-09-29 PROCEDURE — 1123F ACP DISCUSS/DSCN MKR DOCD: CPT | Performed by: NURSE PRACTITIONER

## 2023-09-29 PROCEDURE — 99214 OFFICE O/P EST MOD 30 MIN: CPT | Performed by: NURSE PRACTITIONER

## 2023-09-29 PROCEDURE — 36415 COLL VENOUS BLD VENIPUNCTURE: CPT | Performed by: NURSE PRACTITIONER

## 2023-09-29 PROCEDURE — G8427 DOCREV CUR MEDS BY ELIG CLIN: HCPCS | Performed by: NURSE PRACTITIONER

## 2023-09-29 PROCEDURE — 1036F TOBACCO NON-USER: CPT | Performed by: NURSE PRACTITIONER

## 2023-09-29 RX ORDER — TELMISARTAN 20 MG/1
20 TABLET ORAL DAILY
Qty: 90 TABLET | Refills: 3 | Status: SHIPPED | OUTPATIENT
Start: 2023-09-29

## 2023-09-29 SDOH — ECONOMIC STABILITY: FOOD INSECURITY: WITHIN THE PAST 12 MONTHS, THE FOOD YOU BOUGHT JUST DIDN'T LAST AND YOU DIDN'T HAVE MONEY TO GET MORE.: NEVER TRUE

## 2023-09-29 SDOH — ECONOMIC STABILITY: FOOD INSECURITY: WITHIN THE PAST 12 MONTHS, YOU WORRIED THAT YOUR FOOD WOULD RUN OUT BEFORE YOU GOT MONEY TO BUY MORE.: NEVER TRUE

## 2023-09-29 SDOH — ECONOMIC STABILITY: INCOME INSECURITY: HOW HARD IS IT FOR YOU TO PAY FOR THE VERY BASICS LIKE FOOD, HOUSING, MEDICAL CARE, AND HEATING?: NOT HARD AT ALL

## 2023-09-29 ASSESSMENT — PATIENT HEALTH QUESTIONNAIRE - PHQ9
SUM OF ALL RESPONSES TO PHQ9 QUESTIONS 1 & 2: 2
SUM OF ALL RESPONSES TO PHQ QUESTIONS 1-9: 2
1. LITTLE INTEREST OR PLEASURE IN DOING THINGS: 1
2. FEELING DOWN, DEPRESSED OR HOPELESS: 1

## 2023-09-29 ASSESSMENT — LIFESTYLE VARIABLES
HOW MANY STANDARD DRINKS CONTAINING ALCOHOL DO YOU HAVE ON A TYPICAL DAY: PATIENT DOES NOT DRINK
HOW MANY STANDARD DRINKS CONTAINING ALCOHOL DO YOU HAVE ON A TYPICAL DAY: PATIENT DOES NOT DRINK

## 2023-09-30 LAB
25(OH)D3 SERPL-MCNC: 20.5 NG/ML (ref 30–100)
TSH SERPL DL<=0.05 MIU/L-ACNC: 1.76 UIU/ML (ref 0.36–3.74)

## 2024-02-23 RX ORDER — ERGOCALCIFEROL 1.25 MG/1
CAPSULE ORAL
Qty: 12 CAPSULE | Refills: 3 | Status: SHIPPED | OUTPATIENT
Start: 2024-02-23

## 2024-07-22 DIAGNOSIS — I10 PRIMARY HYPERTENSION: ICD-10-CM

## 2024-07-22 DIAGNOSIS — K21.9 GASTROESOPHAGEAL REFLUX DISEASE, UNSPECIFIED WHETHER ESOPHAGITIS PRESENT: ICD-10-CM

## 2024-07-22 RX ORDER — TELMISARTAN 20 MG/1
20 TABLET ORAL DAILY
Qty: 90 TABLET | Refills: 3 | Status: SHIPPED | OUTPATIENT
Start: 2024-07-22

## 2024-12-05 ENCOUNTER — OFFICE VISIT (OUTPATIENT)
Age: 84
End: 2024-12-05
Payer: MEDICARE

## 2024-12-05 VITALS
DIASTOLIC BLOOD PRESSURE: 78 MMHG | SYSTOLIC BLOOD PRESSURE: 130 MMHG | WEIGHT: 123.6 LBS | RESPIRATION RATE: 16 BRPM | HEART RATE: 78 BPM | HEIGHT: 67 IN | OXYGEN SATURATION: 98 % | BODY MASS INDEX: 19.4 KG/M2 | TEMPERATURE: 97.7 F

## 2024-12-05 DIAGNOSIS — N30.01 ACUTE CYSTITIS WITH HEMATURIA: ICD-10-CM

## 2024-12-05 DIAGNOSIS — R82.90 URINE ABNORMALITY: Primary | ICD-10-CM

## 2024-12-05 LAB
BILIRUBIN, URINE, POC: NEGATIVE
BLOOD URINE, POC: ABNORMAL
GLUCOSE URINE, POC: NEGATIVE
KETONES, URINE, POC: NEGATIVE
LEUKOCYTE ESTERASE, URINE, POC: NEGATIVE
NITRITE, URINE, POC: NEGATIVE
PH, URINE, POC: 5.5 (ref 4.6–8)
PROTEIN,URINE, POC: ABNORMAL
SPECIFIC GRAVITY, URINE, POC: 1.02 (ref 1–1.03)
URINALYSIS CLARITY, POC: ABNORMAL
URINALYSIS COLOR, POC: YELLOW
UROBILINOGEN, POC: ABNORMAL

## 2024-12-05 PROCEDURE — 1036F TOBACCO NON-USER: CPT | Performed by: NURSE PRACTITIONER

## 2024-12-05 PROCEDURE — G8427 DOCREV CUR MEDS BY ELIG CLIN: HCPCS | Performed by: NURSE PRACTITIONER

## 2024-12-05 PROCEDURE — 99213 OFFICE O/P EST LOW 20 MIN: CPT | Performed by: NURSE PRACTITIONER

## 2024-12-05 PROCEDURE — 81003 URINALYSIS AUTO W/O SCOPE: CPT | Performed by: NURSE PRACTITIONER

## 2024-12-05 PROCEDURE — 1160F RVW MEDS BY RX/DR IN RCRD: CPT | Performed by: NURSE PRACTITIONER

## 2024-12-05 PROCEDURE — G8484 FLU IMMUNIZE NO ADMIN: HCPCS | Performed by: NURSE PRACTITIONER

## 2024-12-05 PROCEDURE — 1123F ACP DISCUSS/DSCN MKR DOCD: CPT | Performed by: NURSE PRACTITIONER

## 2024-12-05 PROCEDURE — 1090F PRES/ABSN URINE INCON ASSESS: CPT | Performed by: NURSE PRACTITIONER

## 2024-12-05 PROCEDURE — G8420 CALC BMI NORM PARAMETERS: HCPCS | Performed by: NURSE PRACTITIONER

## 2024-12-05 PROCEDURE — 1159F MED LIST DOCD IN RCRD: CPT | Performed by: NURSE PRACTITIONER

## 2024-12-05 PROCEDURE — 1125F AMNT PAIN NOTED PAIN PRSNT: CPT | Performed by: NURSE PRACTITIONER

## 2024-12-05 RX ORDER — NITROFURANTOIN MACROCRYSTALS 100 MG/1
100 CAPSULE ORAL 2 TIMES DAILY
Qty: 14 CAPSULE | Refills: 0 | Status: SHIPPED | OUTPATIENT
Start: 2024-12-05 | End: 2024-12-12

## 2024-12-05 ASSESSMENT — PATIENT HEALTH QUESTIONNAIRE - PHQ9
SUM OF ALL RESPONSES TO PHQ QUESTIONS 1-9: 0
2. FEELING DOWN, DEPRESSED OR HOPELESS: NOT AT ALL
SUM OF ALL RESPONSES TO PHQ QUESTIONS 1-9: 0
SUM OF ALL RESPONSES TO PHQ9 QUESTIONS 1 & 2: 0
SUM OF ALL RESPONSES TO PHQ QUESTIONS 1-9: 0
1. LITTLE INTEREST OR PLEASURE IN DOING THINGS: NOT AT ALL
SUM OF ALL RESPONSES TO PHQ QUESTIONS 1-9: 0

## 2024-12-06 NOTE — PROGRESS NOTES
Subjective:      Megan Calderon is a 84 y.o. female who complains of frequency, foul smelling urine She has had symptoms for awhile (at least a couple weeks per patient) . Patient also complains of back pain and she notes she always has back pain . Patient denies vaginal discharge. Patient does have a history of recurrent UTI.  Patient does not have a history of pyelonephritis.   Past Medical History:   Diagnosis Date    Encounter for monitoring primary estrogen replacement therapy     GERD (gastroesophageal reflux disease)     Hypercholesterolemia      Patient Active Problem List    Diagnosis Date Noted    Acute cystitis without hematuria 07/22/2019    Rash and nonspecific skin eruption 07/22/2019    GERD (gastroesophageal reflux disease)     Muscle spasm 02/28/2019    Essential hypertension 12/20/2018    Perioral numbness 11/12/2018    Acute bilateral back pain 11/12/2018    Hypercholesterolemia     Spasmodic torticollis 06/02/2015     Past Surgical History:   Procedure Laterality Date    APPENDECTOMY  08/2021    COLONOSCOPY  2010    GYN      hysterectomy partial    HEENT      cataract both eyes    UPPER GASTROINTESTINAL ENDOSCOPY  2010     Family History   Problem Relation Age of Onset    Cancer Brother 78        pancreatis    Coronary Art Dis Brother     Heart Disease Sister     Cancer Father 75        throat    Diabetes Sister      Social History     Socioeconomic History    Marital status:      Spouse name: None    Number of children: None    Years of education: None    Highest education level: None   Tobacco Use    Smoking status: Never    Smokeless tobacco: Never   Vaping Use    Vaping status: Never Used   Substance and Sexual Activity    Alcohol use: Never    Drug use: Never    Sexual activity: Not Currently     Social Determinants of Health     Financial Resource Strain: Low Risk  (9/29/2023)    Overall Financial Resource Strain (CARDIA)     Difficulty of Paying Living Expenses: Not hard at all

## 2024-12-07 LAB
BACTERIA SPEC CULT: NORMAL
SERVICE CMNT-IMP: NORMAL

## 2024-12-10 ENCOUNTER — TELEPHONE (OUTPATIENT)
Age: 84
End: 2024-12-10

## 2025-03-24 ENCOUNTER — OFFICE VISIT (OUTPATIENT)
Age: 85
End: 2025-03-24
Payer: MEDICARE

## 2025-03-24 ENCOUNTER — TELEPHONE (OUTPATIENT)
Age: 85
End: 2025-03-24

## 2025-03-24 VITALS
OXYGEN SATURATION: 96 % | RESPIRATION RATE: 16 BRPM | SYSTOLIC BLOOD PRESSURE: 120 MMHG | BODY MASS INDEX: 19.37 KG/M2 | WEIGHT: 123.4 LBS | DIASTOLIC BLOOD PRESSURE: 60 MMHG | HEART RATE: 91 BPM | HEIGHT: 67 IN | TEMPERATURE: 97.7 F

## 2025-03-24 DIAGNOSIS — I10 ESSENTIAL HYPERTENSION: ICD-10-CM

## 2025-03-24 DIAGNOSIS — Z00.00 MEDICARE ANNUAL WELLNESS VISIT, SUBSEQUENT: Primary | ICD-10-CM

## 2025-03-24 DIAGNOSIS — R53.82 CHRONIC FATIGUE: ICD-10-CM

## 2025-03-24 DIAGNOSIS — E78.00 HYPERCHOLESTEROLEMIA: ICD-10-CM

## 2025-03-24 DIAGNOSIS — R31.9 URINARY TRACT INFECTION WITH HEMATURIA, SITE UNSPECIFIED: ICD-10-CM

## 2025-03-24 DIAGNOSIS — N39.0 URINARY TRACT INFECTION WITH HEMATURIA, SITE UNSPECIFIED: ICD-10-CM

## 2025-03-24 LAB
BILIRUBIN, URINE, POC: NEGATIVE
BLOOD URINE, POC: ABNORMAL
GLUCOSE URINE, POC: NEGATIVE
KETONES, URINE, POC: NEGATIVE
LEUKOCYTE ESTERASE, URINE, POC: ABNORMAL
NITRITE, URINE, POC: NEGATIVE
PH, URINE, POC: 5.5 (ref 4.6–8)
PROTEIN,URINE, POC: ABNORMAL
SPECIFIC GRAVITY, URINE, POC: 1.02 (ref 1–1.03)
URINALYSIS CLARITY, POC: CLEAR
URINALYSIS COLOR, POC: YELLOW
UROBILINOGEN, POC: ABNORMAL

## 2025-03-24 PROCEDURE — G8427 DOCREV CUR MEDS BY ELIG CLIN: HCPCS | Performed by: NURSE PRACTITIONER

## 2025-03-24 PROCEDURE — 1090F PRES/ABSN URINE INCON ASSESS: CPT | Performed by: NURSE PRACTITIONER

## 2025-03-24 PROCEDURE — G8420 CALC BMI NORM PARAMETERS: HCPCS | Performed by: NURSE PRACTITIONER

## 2025-03-24 PROCEDURE — 99214 OFFICE O/P EST MOD 30 MIN: CPT | Performed by: NURSE PRACTITIONER

## 2025-03-24 PROCEDURE — G0439 PPPS, SUBSEQ VISIT: HCPCS | Performed by: NURSE PRACTITIONER

## 2025-03-24 PROCEDURE — 36415 COLL VENOUS BLD VENIPUNCTURE: CPT | Performed by: NURSE PRACTITIONER

## 2025-03-24 PROCEDURE — 81003 URINALYSIS AUTO W/O SCOPE: CPT | Performed by: NURSE PRACTITIONER

## 2025-03-24 PROCEDURE — 1160F RVW MEDS BY RX/DR IN RCRD: CPT | Performed by: NURSE PRACTITIONER

## 2025-03-24 PROCEDURE — 1126F AMNT PAIN NOTED NONE PRSNT: CPT | Performed by: NURSE PRACTITIONER

## 2025-03-24 PROCEDURE — 1123F ACP DISCUSS/DSCN MKR DOCD: CPT | Performed by: NURSE PRACTITIONER

## 2025-03-24 PROCEDURE — 1159F MED LIST DOCD IN RCRD: CPT | Performed by: NURSE PRACTITIONER

## 2025-03-24 PROCEDURE — 1036F TOBACCO NON-USER: CPT | Performed by: NURSE PRACTITIONER

## 2025-03-24 RX ORDER — NITROFURANTOIN 25; 75 MG/1; MG/1
100 CAPSULE ORAL 2 TIMES DAILY
Qty: 10 CAPSULE | Refills: 0 | Status: SHIPPED | OUTPATIENT
Start: 2025-03-24 | End: 2025-03-29

## 2025-03-24 ASSESSMENT — LIFESTYLE VARIABLES
HOW OFTEN DO YOU HAVE A DRINK CONTAINING ALCOHOL: NEVER
HOW MANY STANDARD DRINKS CONTAINING ALCOHOL DO YOU HAVE ON A TYPICAL DAY: PATIENT DOES NOT DRINK

## 2025-03-24 ASSESSMENT — PATIENT HEALTH QUESTIONNAIRE - PHQ9
SUM OF ALL RESPONSES TO PHQ QUESTIONS 1-9: 0
SUM OF ALL RESPONSES TO PHQ QUESTIONS 1-9: 0
2. FEELING DOWN, DEPRESSED OR HOPELESS: NOT AT ALL
SUM OF ALL RESPONSES TO PHQ QUESTIONS 1-9: 0
SUM OF ALL RESPONSES TO PHQ QUESTIONS 1-9: 0
1. LITTLE INTEREST OR PLEASURE IN DOING THINGS: NOT AT ALL

## 2025-03-24 NOTE — TELEPHONE ENCOUNTER
Pt states that she took one pill of  nitrofurantoin, macrocrystal-monohydrate, (MACROBID) 100 MG capsule and not long after her hands started getting red and itchy. States that she was also using and changed the swiffer wet jet  solution so it could have gotten on her hands.  She has washed her hands but they are she red and itchy. Please advise.

## 2025-03-24 NOTE — PROGRESS NOTES
Chief Complaint   Patient presents with    Medicare AWV       ASSESSMENT AND PLAN:       1. Urinary tract infection with hematuria, site unspecified    - AMB POC URINALYSIS DIP STICK AUTO W/O MICRO  - Culture, Urine; Future  - Culture, Urine    2. Essential hypertension    - COLLECTION VENOUS BLOOD,VENIPUNCTURE  - CBC with Auto Differential; Future  - Comprehensive Metabolic Panel; Future  - Lipid Panel; Future  - TSH; Future  - TSH  - Lipid Panel  - Comprehensive Metabolic Panel  - CBC with Auto Differential    3. Hypercholesterolemia      4. Chronic fatigue    - Vitamin D 25 Hydroxy; Future  - Vitamin B12 & Folate; Future  - Magnesium; Future  - Magnesium  - Vitamin B12 & Folate  - Vitamin D 25 Hydroxy    5. Medicare annual wellness visit, subsequent       See attached AWV. UA with trace hematuria. Send for culture and tx symptoms with above medications. Take antibiotic to completion. Increase fluids to 2 liters daily of mostly water. Void with urge. Monitor temperature. Return if no better in 48 hours. To ER with high fever greater than 103, vomiting, abdominal pain, diarrhea, dehydration, not voiding greater than 6 hours, severe back or flank pain.      Consider CT if culture is negative as it was in December. See attached AWV. Check up labs today.     Patient aware of plan of care and verbalized understanding. Questions answered. RTC 6 months, or sooner if needed.    HPI:     is a 84 y.o. female in today for her AWV and UTI symptoms.    HTN: Compliant with telmisartan. Does not routinely monitor BP at home. Denies HA, CP.     GERD: Prevacid changed to pantoprazole after her appendectomy, then on Nexium. No longer using. Now on lansoprazole OTC.     New issues: She notes lower back pain, dysuria, irritation in her bladder, urinary frequency. She's had symptoms on and off since December. She otherwise feels well.     No Known Allergies    Prior to Visit Medications    Medication Sig Taking? Authorizing

## 2025-03-24 NOTE — PROGRESS NOTES
\"Have you been to the ER, urgent care clinic since your last visit?  Hospitalized since your last visit?\"    NO    “Have you seen or consulted any other health care providers outside our system since your last visit?”    NO            done/NKA

## 2025-03-24 NOTE — TELEPHONE ENCOUNTER
This is the same antibiotic she had in December so I suspect her reaction is from the cleaning solution. I would wait for the hands to clear up and then restart the antibiotic.

## 2025-03-25 ENCOUNTER — RESULTS FOLLOW-UP (OUTPATIENT)
Age: 85
End: 2025-03-25
Payer: MEDICARE

## 2025-03-25 DIAGNOSIS — E87.1 HYPONATREMIA: Primary | ICD-10-CM

## 2025-03-25 LAB
25(OH)D3 SERPL-MCNC: 33.3 NG/ML (ref 30–100)
ALBUMIN SERPL-MCNC: 3.6 G/DL (ref 3.5–5)
ALBUMIN/GLOB SERPL: 0.9 (ref 1.1–2.2)
ALP SERPL-CCNC: 80 U/L (ref 45–117)
ALT SERPL-CCNC: 12 U/L (ref 12–78)
ANION GAP SERPL CALC-SCNC: 7 MMOL/L (ref 2–12)
AST SERPL-CCNC: 15 U/L (ref 15–37)
BASOPHILS # BLD: 0.06 K/UL (ref 0–0.1)
BASOPHILS NFR BLD: 1.2 % (ref 0–1)
BILIRUB SERPL-MCNC: 0.8 MG/DL (ref 0.2–1)
BUN SERPL-MCNC: 14 MG/DL (ref 6–20)
BUN/CREAT SERPL: 20 (ref 12–20)
CALCIUM SERPL-MCNC: 9.6 MG/DL (ref 8.5–10.1)
CHLORIDE SERPL-SCNC: 100 MMOL/L (ref 97–108)
CHOLEST SERPL-MCNC: 153 MG/DL
CO2 SERPL-SCNC: 24 MMOL/L (ref 21–32)
CREAT SERPL-MCNC: 0.7 MG/DL (ref 0.55–1.02)
DIFFERENTIAL METHOD BLD: ABNORMAL
EOSINOPHIL # BLD: 0.05 K/UL (ref 0–0.4)
EOSINOPHIL NFR BLD: 1 % (ref 0–7)
ERYTHROCYTE [DISTWIDTH] IN BLOOD BY AUTOMATED COUNT: 14.5 % (ref 11.5–14.5)
FOLATE SERPL-MCNC: 10.6 NG/ML (ref 5–21)
GLOBULIN SER CALC-MCNC: 3.9 G/DL (ref 2–4)
GLUCOSE SERPL-MCNC: 79 MG/DL (ref 65–100)
HCT VFR BLD AUTO: 40 % (ref 35–47)
HDLC SERPL-MCNC: 80 MG/DL
HDLC SERPL: 1.9 (ref 0–5)
HGB BLD-MCNC: 12.4 G/DL (ref 11.5–16)
IMM GRANULOCYTES # BLD AUTO: 0.02 K/UL (ref 0–0.04)
IMM GRANULOCYTES NFR BLD AUTO: 0.4 % (ref 0–0.5)
LDLC SERPL CALC-MCNC: 55.2 MG/DL (ref 0–100)
LYMPHOCYTES # BLD: 0.7 K/UL (ref 0.8–3.5)
LYMPHOCYTES NFR BLD: 14.3 % (ref 12–49)
MAGNESIUM SERPL-MCNC: 2.2 MG/DL (ref 1.6–2.4)
MCH RBC QN AUTO: 28.3 PG (ref 26–34)
MCHC RBC AUTO-ENTMCNC: 31 G/DL (ref 30–36.5)
MCV RBC AUTO: 91.3 FL (ref 80–99)
MONOCYTES # BLD: 0.63 K/UL (ref 0–1)
MONOCYTES NFR BLD: 12.9 % (ref 5–13)
NEUTS SEG # BLD: 3.44 K/UL (ref 1.8–8)
NEUTS SEG NFR BLD: 70.2 % (ref 32–75)
NRBC # BLD: 0 K/UL (ref 0–0.01)
NRBC BLD-RTO: 0 PER 100 WBC
PLATELET # BLD AUTO: 141 K/UL (ref 150–400)
PMV BLD AUTO: 11.6 FL (ref 8.9–12.9)
POTASSIUM SERPL-SCNC: 4.3 MMOL/L (ref 3.5–5.1)
PROT SERPL-MCNC: 7.5 G/DL (ref 6.4–8.2)
RBC # BLD AUTO: 4.38 M/UL (ref 3.8–5.2)
RBC MORPH BLD: ABNORMAL
SODIUM SERPL-SCNC: 131 MMOL/L (ref 136–145)
TRIGL SERPL-MCNC: 89 MG/DL
TSH SERPL DL<=0.05 MIU/L-ACNC: 1.89 UIU/ML (ref 0.36–3.74)
VIT B12 SERPL-MCNC: 136 PG/ML (ref 193–986)
VLDLC SERPL CALC-MCNC: 17.8 MG/DL
WBC # BLD AUTO: 4.9 K/UL (ref 3.6–11)

## 2025-03-25 PROCEDURE — 36415 COLL VENOUS BLD VENIPUNCTURE: CPT | Performed by: NURSE PRACTITIONER

## 2025-03-26 LAB
BACTERIA SPEC CULT: NORMAL
SERVICE CMNT-IMP: NORMAL

## 2025-05-12 DIAGNOSIS — I10 PRIMARY HYPERTENSION: ICD-10-CM

## 2025-05-12 RX ORDER — TELMISARTAN 20 MG/1
20 TABLET ORAL DAILY
Qty: 90 TABLET | Refills: 3 | Status: SHIPPED | OUTPATIENT
Start: 2025-05-12

## 2025-05-12 NOTE — TELEPHONE ENCOUNTER
Medication Refill Request    Megan Calderon is requesting a refill of the following medication(s):   telmisartan (MICARDIS) 20 MG tablet   Please send refill to:      [facility] - [place], [place] - [address] - P [phone] - F [phone]  [address]  [facility] [address]  Phone: [phone] Fax: [phone]